# Patient Record
Sex: FEMALE | Race: WHITE
[De-identification: names, ages, dates, MRNs, and addresses within clinical notes are randomized per-mention and may not be internally consistent; named-entity substitution may affect disease eponyms.]

---

## 2017-10-28 ENCOUNTER — HOSPITAL ENCOUNTER (EMERGENCY)
Dept: HOSPITAL 43 - DL.ED | Age: 72
Discharge: HOME | End: 2017-10-28
Payer: MEDICARE
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
## 2017-10-28 DIAGNOSIS — S50.12XA: Primary | ICD-10-CM

## 2017-10-28 DIAGNOSIS — Z79.899: ICD-10-CM

## 2017-10-28 NOTE — EDM.PDOC
ED HPI GENERAL MEDICAL PROBLEM





- General


Chief Complaint: Upper Extremity Injury/Pain


Stated Complaint: [id number] DID SOMETHING TO ARM


Time Seen by Provider: 10/28/17 09:58


Source of Information: Reports: Patient


History Limitations: Reports: No Limitations





- History of Present Illness


INITIAL COMMENTS - FREE TEXT/NARRATIVE: 





71 yo white female c/o left elbow and left forearm pain since last night when 

she was placing a tote on shelf that fell forward onto forearm @ 5pm yesterday.

  Pt. states she applied icyhot to area.


Onset: Today


Onset Date: 10/27/17


Duration: Hour(s):


Location: Reports: Upper Extremity, Left (elbow)


Quality: Reports: Ache


Severity: Mild


Improves with: Reports: Rest


Worsens with: Reports: Movement


Context: Reports: Trauma


Associated Symptoms: Reports: No Other Symptoms





- Related Data


 Allergies











Allergy/AdvReac Type Severity Reaction Status Date / Time


 


No Known Allergies Allergy   Verified 10/28/17 09:51











Home Meds: 


 Home Meds





Acetaminophen [Tylenol Extra Strength] 500 mg PO BID 10/28/17 [History]


Amiodarone [Cordarone] 200 mg PO DAILY 10/28/17 [History]


Atenolol 75 mg PO DAILY 10/28/17 [History]


Budesonide/Formoterol Fumarate [Symbicort 80-4.5 Mcg Inhaler] 1 puff INH DAILY 

10/28/17 [History]


Cholecalciferol (Vitamin D3) [Vitamin D3] 2,000 unit PO BID 10/28/17 [History]


Diltiazem HCl [Cartia Xt] 120 mg PO DAILY 10/28/17 [History]


Fish Oil/Omega-3 Fatty Acids [Fish Oil 1,000 MG] 1,400 mg PO DAILY 10/28/17 [

History]


Fluticasone Propionate [Flonase] 50 mcg [place] DAILY 10/28/17 [History]


Furosemide 20 mg PO DAILY 10/28/17 [History]


Losartan [Cozaar] 50 mg PO DAILY 10/28/17 [History]


Multivitamin [Daily Multiple Vitamin] 1 tab PO DAILY 10/28/17 [History]


Potassium Chloride [Klor-Con M20] 20 meq PO DAILY 10/28/17 [History]


Rivaroxaban [Xarelto] 20 mg PO DAILY 10/28/17 [History]


Spironolactone [Aldactone] 25 mg PO DAILY 10/28/17 [History]


atorvaSTATin [Lipitor] 20 mg PO DAILY 10/28/17 [History]











Social & Family History





- Tobacco Use


Smoking Status *Q: Never Smoker





- Caffeine Use


Caffeine Use: Reports: Coffee, Tea





- Recreational Drug Use


Recreational Drug Use: No





Review of Systems





- Review of Systems


Review Of Systems: See Below


Constitutional: Reports: No Symptoms


Eyes: Reports: No Symptoms


Ears: Reports: No Symptoms


Nose: Reports: No Symptoms


Mouth/Throat: Reports: No Symptoms


Respiratory: Reports: No Symptoms


Cardiovascular: Reports: No Symptoms


GI/Abdominal: Reports: No Symptoms


Genitourinary: Reports: No Symptoms


Musculoskeletal: Reports: Muscle Pain (left forearm)


Skin: Reports: No Symptoms


Neurological: Reports: No Symptoms


Psychiatric: Reports: No Symptoms





ED EXAM, GENERAL





- Physical Exam


Exam: See Below


Exam Limited By: No Limitations


General Appearance: Alert, WD/WN, No Apparent Distress


Eye Exam: Bilateral Eye: PERRL


Ears: Normal External Exam


Nose: Normal Inspection


Throat/Mouth: Normal Inspection


Head: Atraumatic


Neck: Normal Inspection


Respiratory/Chest: No Respiratory Distress


Cardiovascular: Normal Peripheral Pulses


Peripheral Pulses: 2+: Radial (L), Radial (R)


Back Exam: Normal Inspection


Extremities: Normal Inspection, Normal Range of Motion, Other (left proximal 

ventral forearm muscle tenderness w/o swelling and no bruising)


Neurological: Alert, Oriented, CN II-XII Intact, Normal Cognition


Psychiatric: Normal Affect


Skin Exam: Warm, Dry


Lymphatic: No Adenopathy





Course





- Vital Signs


Last Recorded V/S: 


 Last Vital Signs











Temp  35.3 C   10/28/17 09:51


 


Pulse  60   10/28/17 09:51


 


Resp  18   10/28/17 09:51


 


BP  122/56 L  10/28/17 09:51


 


Pulse Ox  96   10/28/17 09:51














- Orders/Labs/Meds


Orders: 


 Active Orders 24 hr











 Category Date Time Status


 


 Elbow 2V Lt [CR] Urgent Exams  10/28/17 09:58 Taken














Departure





- Departure


Time of Disposition: 10:17


Disposition: Home, Self-Care 01


Condition: Good


Clinical Impression: 


 Muscle contusion








- Discharge Information


Forms:  ED Department Discharge


Additional Instructions: 


Rest





Apply Ice Pack to area TID X 15 mins.





For Pain: Use otc TYLENOL ES 500mg QID as needed  


                           OR


                         ADVIL 400mg TID w/ food





F/U w/ PCP





- My Orders


Last 24 Hours: 


My Active Orders





10/28/17 09:58


Elbow 2V Lt [CR] Urgent 














- Assessment/Plan


Last 24 Hours: 


My Active Orders





10/28/17 09:58


Elbow 2V Lt [CR] Urgent

## 2018-04-19 ENCOUNTER — HOSPITAL ENCOUNTER (INPATIENT)
Dept: HOSPITAL 43 - DL.MS | Age: 73
LOS: 4 days | Discharge: HOME | DRG: 193 | End: 2018-04-23
Attending: INTERNAL MEDICINE | Admitting: INTERNAL MEDICINE
Payer: MEDICARE

## 2018-04-19 DIAGNOSIS — I13.0: ICD-10-CM

## 2018-04-19 DIAGNOSIS — N17.9: ICD-10-CM

## 2018-04-19 DIAGNOSIS — I25.10: ICD-10-CM

## 2018-04-19 DIAGNOSIS — I48.91: ICD-10-CM

## 2018-04-19 DIAGNOSIS — E78.00: ICD-10-CM

## 2018-04-19 DIAGNOSIS — J18.9: Primary | ICD-10-CM

## 2018-04-19 DIAGNOSIS — Z79.899: ICD-10-CM

## 2018-04-19 DIAGNOSIS — N18.3: ICD-10-CM

## 2018-04-19 DIAGNOSIS — Z79.01: ICD-10-CM

## 2018-04-19 DIAGNOSIS — E11.22: ICD-10-CM

## 2018-04-19 DIAGNOSIS — H54.7: ICD-10-CM

## 2018-04-19 DIAGNOSIS — J44.0: ICD-10-CM

## 2018-04-19 DIAGNOSIS — I50.33: ICD-10-CM

## 2018-04-19 DIAGNOSIS — J96.01: ICD-10-CM

## 2018-04-19 RX ADMIN — PROMETHAZINE HYDROCHLORIDE AND CODEINE PHOSPHATE PRN ML: 10; 6.25 SOLUTION ORAL at 14:03

## 2018-04-19 RX ADMIN — Medication SCH PUFF: at 20:43

## 2018-04-19 RX ADMIN — Medication SCH UNIT: at 20:44

## 2018-04-19 RX ADMIN — Medication PRN ML: at 20:53

## 2018-04-19 RX ADMIN — Medication PRN ML: at 14:03

## 2018-04-19 RX ADMIN — PROMETHAZINE HYDROCHLORIDE AND CODEINE PHOSPHATE PRN ML: 10; 6.25 SOLUTION ORAL at 23:48

## 2018-04-19 NOTE — PCM.HP
H&P History of Present Illness





- General


Date of Service: 04/19/18


Source of Information: Patient





- History of Present Illness


Initial Comments - Free Text/Narative: 





74 yo with ho cad, diastolic chf, dm, copd 


presented with cough, subjective fever, 


sick contact: daughter





no associated cp


symptoms started 3 days PTA


went to see PMD - noted to have infiltrate


has mild edema, not worse lately














- Related Data


Allergies/Adverse Reactions: 


 Allergies











Allergy/AdvReac Type Severity Reaction Status Date / Time


 


No Known Allergies Allergy   Verified 04/19/18 12:25











Home Medications: 


 Home Meds





Acetaminophen [Tylenol Extra Strength] 500 mg PO BID PRN 10/28/17 [History]


Amiodarone [Cordarone] 200 mg PO DAILY 10/28/17 [History]


Budesonide/Formoterol Fumarate [Symbicort 80-4.5 Mcg Inhaler] 2 puff INH BID 10/

28/17 [History]


Cholecalciferol (Vitamin D3) [Vitamin D3] 2,000 unit PO BID 10/28/17 [History]


Diltiazem HCl [Cartia Xt] 120 mg PO DAILY 10/28/17 [History]


Fish Oil/Omega-3 Fatty Acids [Fish Oil 1,000 MG] 1,000 mg PO DAILY 10/28/17 [

History]


Fluticasone Propionate [Flonase] 50 mcg [place] DAILY 10/28/17 [History]


Losartan [Cozaar] 50 mg PO DAILY 10/28/17 [History]


Multivitamin [Daily Multiple Vitamin] 1 tab PO DAILY 10/28/17 [History]


RX: Atenolol 75 mg PO DAILY 10/28/17 [History]


RX: Furosemide 20 mg PO DAILY 10/28/17 [History]


RX: Potassium Chloride [Klor-Con M20] 20 meq PO DAILY 10/28/17 [History]


RX: Spironolactone [Aldactone] 25 mg PO DAILY 10/28/17 [History]


RX: atorvaSTATin [Lipitor] 20 mg PO DAILY 10/28/17 [History]


Rivaroxaban [Xarelto] 20 mg PO DAILY 10/28/17 [History]


Albuterol [Proair HFA] 2 puff INH DAILY PRN 04/19/18 [History]


Codeine/Promethazine [Phenergan with Codeine] 5 ml PO Q6HR PRN 04/19/18 [History

]


RX: Loratadine 10 mg PO DAILY 04/19/18 [History]











Past Medical History


HEENT History: Reports: Impaired Vision


Cardiovascular History: Reports: Heart Failure, High Cholesterol, Hypertension


Respiratory History: Reports: None


Genitourinary History: Reports: None


OB/GYN History: Reports: None


Neurological History: Reports: None


Psychiatric History: Reports: None


Endocrine/Metabolic History: Reports: None


Hematologic History: Reports: None


Immunologic History: Reports: None


Oncologic (Cancer) History: Reports: None


Dermatologic History: Reports: None





Social & Family History





- Tobacco Use


Smoking Status *Q: Never Smoker





- Caffeine Use


Caffeine Use: Reports: Coffee, Tea





- Recreational Drug Use


Recreational Drug Use: No





H&P Review of Systems





- Review of Systems:


Review Of Systems: See Below (subjective)


General: Reports: Fever


HEENT: Reports: Sore Throat


Pulmonary: Reports: Shortness of Breath, Cough.  Denies: Wheezing, Sputum


Cardiovascular: Denies: Chest Pain


Gastrointestinal: Denies: Abdominal Pain


Psychiatric: Denies: Confusion





Exam





- Exam


Exam: See Below





- Exam


Quality Assessment: No: Supplemental Oxygen


General: Alert, Oriented


Neck: Supple


Lungs: Normal Respiratory Effort, Crackles (basilar)


Cardiovascular: Regular Rate, Regular Rhythm


GI/Abdominal Exam: Normal Bowel Sounds, Soft, Non-Tender


Extremities: Pedal Edema (1+ b/l)


Skin: Warm, Dry


Neuro Extensive - Mental Status: Alert, Oriented x3, Normal Mood/Affect


Psychiatric: Alert, Normal Affect, Normal Mood





- Patient Data


Lab Results Last 24 hrs: 





Results for GERARDO ALVAREZ JORGE (MRN [id number]) as of 4/19/2018 12:53











 Ref. Range 4/3/2018 12:01 4/19/2018 09:57 4/19/2018 09:58


 


BUN Latest Ref Range: 7 - 18 mg/dL 23 (H)  13


 


Sodium Latest Ref Range: 136 - 145 mmol/L 140  138


 


Potassium Latest Ref Range: 3.5 - 5.1 mmol/L 4.2  4.2


 


Chloride Latest Ref Range: 98 - 107 mmol/L 100  98


 


CO2 Latest Ref Range: 21.0 - 32.0 mmol/L 29.2  28.8


 


SERUM GLUCOSE Latest Ref Range: 70 - 99 mg/dL 154 (H)  226 (H)


 


Creatinine Latest Ref Range: 0.6 - 1.0 mg/dL 1.4 (H)  1.7 (H)


 


Calcium Latest Ref Range: 8.5 - 10.1 mg/dL 9.9  9.7


 


ANION GAP Latest Ref Range: 5.0 - 13.0 mmol/L 10.8  11.2


 


GFR Calculated Latest Units: mL/min/1.73 sq m 37  29








Results for GERARDO ALVAREZ (MRN [id number]) as of 4/19/2018 12:53











 Ref. Range 4/19/2018 09:58


 


WBC Latest Ref Range: 3.60 - 11.00 K/uL 14.78 (H)


 


RBC Latest Ref Range: 3.80 - 5.20 M/uL 4.42


 


Hemoglobin Latest Ref Range: 12.0 - 16.0 g/dL 14.2


 


Hematocrit Latest Ref Range: 40.0 - 52.0 % 42.1


 


MCV Latest Ref Range: 80.0 - 100.0 fL 95.2


 


MCH Latest Ref Range: 26.0 - 34.0 pg 32.1


 


MCHC Latest Ref Range: 32.0 - 36.0 g/dL 33.7


 


RDW Latest Ref Range: 37.0 - 50.0 fL 45.6


 


Platelets Latest Ref Range: 150 - 440 K/L 208


 


MPV Latest Ref Range: 8.0 - 13.0 fL 9.9














Imaging Impressions Last 24 hrs: 





from [facility]


cxr 4/19/18


IMPRESSION: 


1. Heart size is borderline with Central vessels at the upper normal.


2. Basilar markings are somewhat difficult to assess. There are prominent I 

believe slightly increased when compared with previous study. I suspect there 

may be some minimal developing basilar infiltrate superimposing chronic 

fibrotic basilar changes. Differential would include congestion but I feel more 

likely minimal infiltrate. I would recommend treatment and short-term follow-up.


3. Pulmonary nodule within the RIGHT upper lobe stable and unchanged


4. Aortic ASVD and tortuosity


5. Underlying hyperinflation.


6. Degenerative changes and dextroscoliosis of the dorsal spine.


Problem List Initiated/Reviewed/Updated: Yes


Orders Last 24hrs: 


 Active Orders 24 hr











 Category Date Time Status


 


 Glucose [Blood Glucose Check, Bedside] [RC] QIDACANDBED Care  04/19/18 12:58 

Ordered


 


 RT Aerosol Therapy [RC] ASDIRECTED Care  04/19/18 12:59 Ordered


 


 BASIC METABOLIC PANEL,BMP [CHEM] AM Lab  04/20/18 05:15 Ordered


 


 CBC WITH AUTO DIFF [HEME] AM Lab  04/20/18 05:15 Ordered


 


 CULTURE BLOOD [BC] Stat Lab  04/19/18 12:58 Ordered


 


 CULTURE BLOOD [BC] Stat Lab  04/19/18 12:58 Ordered


 


 CULTURE SPUTUM + SMEAR [RM] Routine Lab  04/19/18 12:59 Ordered


 


 Acetaminophen [Tylenol Extra Strength] Med  04/19/18 12:51 Ordered





 500 mg PO Q4H PRN   


 


 Albuterol/Ipratropium [DuoNeb 3.0-0.5 MG/3 ML] Med  04/19/18 12:59 Ordered





 3 ml NEB Q6HRRT PRN   


 


 Amiodarone [Cordarone] Med  04/20/18 09:00 Ordered





 200 mg PO DAILY   


 


 Atenolol [Tenormin] Med  04/20/18 09:00 Ordered





 75 mg PO DAILY   


 


 Azithromycin [Zithromax] 500 mg Med  04/19/18 13:00 Ordered





 Sodium Chloride 0.9% [Normal Saline] 250 ml   





 IV Q24H   


 


 Budesonide/Formoterol Fumarate [Symbicort 80-4.5 Mcg Med  04/19/18 21:00 

Ordered





 Inhaler]   





 2 puff INH BID   


 


 Cholecalciferol (Vitamin D3) [Vitamin D3] Med  04/19/18 21:00 Ordered





 2,000 unit PO BID   


 


 Codeine/Promethazine [Phenergan with Codeine] Med  04/19/18 12:51 Ordered





 5 ml PO Q6HR PRN   


 


 Diltiazem [Cardizem CD] Med  04/20/18 09:00 Ordered





 120 mg PO DAILY   


 


 Fish Oil/Omega-3 Fatty Acids [Fish Oil 1,000 MG] Med  04/20/18 09:00 Ordered





 1,000 mg PO DAILY   


 


 Fluticasone Propionate [Flonase] Med  04/20/18 09:00 Ordered





 0.0001 gm [place]WTON DAILY   


 


 Insulin Aspart [NovoLOG] Med  04/19/18 17:00 Ordered





 See Protocol  SUBCUT TIDAC   


 


 Loratadine [Claritin] Med  04/20/18 09:00 Ordered





 10 mg PO DAILY   


 


 Losartan [Cozaar] Med  04/20/18 09:00 Ordered





 50 mg PO DAILY   


 


 Multivitamin [Daily Multiple Vitamin] Med  04/20/18 09:00 Ordered





 1 tab PO DAILY   


 


 Potassium Chloride [Klor-Con M20] Med  04/20/18 09:00 Ordered





 20 meq PO DAILY   


 


 Rivaroxaban [Xarelto] Med  04/20/18 09:00 Ordered





 20 mg PO DAILY   


 


 Spironolactone [Aldactone] Med  04/20/18 09:00 Ordered





 25 mg PO DAILY   


 


 atorvaSTATin [Lipitor] Med  04/20/18 09:00 Ordered





 20 mg PO DAILY   


 


 cefTRIAXone [Rocephin] 1,000 mg Med  04/19/18 13:00 Ordered





 Sodium Chloride 0.9% [Normal Saline] 100 ml   





 IV Q24H   


 


 Blood Culture x2 Reflex Set [OM.PC] Stat Oth  04/19/18 12:58 Ordered








 Medication Orders





Acetaminophen (Tylenol Extra Strength)  500 mg PO Q4H PRN


   PRN Reason: Pain, fever


Albuterol/Ipratropium (Duoneb 3.0-0.5 Mg/3 Ml)  3 ml NEB Q6HRRT PRN


   PRN Reason: sob


Amiodarone HCl (Cordarone)  200 mg PO DAILY [facility]


Atenolol (Tenormin)  75 mg PO DAILY [facility]


Atorvastatin Calcium (Lipitor)  20 mg PO DAILY [facility]


Diltiazem HCl (Cardizem Cd)  120 mg PO DAILY [facility]


Fluticasone Propionate (Flonase)  0.0001 gm NEWTON DAILY [facility]


Azithromycin 500 mg/ Sodium (Chloride)  250 mls @ 250 mls/hr IV Q24H [facility]


Ceftriaxone Sodium 1,000 mg/ (Sodium Chloride)  100 mls @ 200 mls/hr IV Q24H [facility]


Insulin Aspart (Novolog)  0 unit SUBCUT TIDAC [facility]; Protocol


Loratadine (Claritin)  10 mg PO DAILY [facility]


Losartan Potassium (Cozaar)  50 mg PO DAILY [facility]


Non-Formulary Medication (Budesonide/Formoterol Fumarate [Symbicort 80-4.5 Mcg 

Inhaler])  2 puff INH BID [facility]


Non-Formulary Medication (Cholecalciferol (Vitamin D3) [Vitamin D3])  2,000 

unit PO BID [facility]


Non-Formulary Medication (Fish Oil/Omega-3 Fatty Acids [Fish Oil 1,000 Mg])  1,

000 mg PO DAILY [facility]


Non-Formulary Medication (Multivitamin [Daily Multiple Vitamin])  1 tab PO 

DAILY [facility]


Non-Formulary Medication (Potassium Chloride [Klor-Con M20])  20 meq PO DAILY 

[facility]


Non-Formulary Medication (Rivaroxaban [Xarelto])  20 mg PO DAILY [facility]


Promethazine HCl/Codeine (Phenergan With Codeine)  5 ml PO Q6HR PRN


   PRN Reason: Cough


Spironolactone (Aldactone)  25 mg PO DAILY [facility]








Assessment/Plan Comment:: 








1. cough, fever, leukocytosis


R. LL pneumonia





will obtain BC, sputum cx





start azithro, rocephin





2. Acute exacerbation of diastolic chf





last echo in 2016


There is mild to moderate concentric left ventricular hypertrophy.


Ejection Fraction = 55-60%.


The left atrium is moderately dilated.


The right atrium is moderately dilated.


There is mild to moderate mitral regurgitation.


There is mild tricuspid regurgitation.


Right ventricular systolic pressure is elevated at 40-50mmHg.





will increase lasix to IV BID for a few doses


follow elytes and renal fx.





3. YASMIN with CKD III


follow with increased diuretics





4. Afib


appears in NS


cont amiodarone


cont anticoagulation with Xarelto





5. COPD 


no apparent exacerbation


cont symbicort


duoneb prn





6. DVT prophylaxis with Xarelto

## 2018-04-20 LAB
ANION GAP SERPL CALC-SCNC: 12.4 MMOL/L
CHLORIDE SERPL-SCNC: 100 MMOL/L (ref 101–111)
SODIUM SERPL-SCNC: 138 MMOL/L (ref 135–145)

## 2018-04-20 RX ADMIN — Medication SCH UNIT: at 20:52

## 2018-04-20 RX ADMIN — PROMETHAZINE HYDROCHLORIDE AND CODEINE PHOSPHATE PRN ML: 10; 6.25 SOLUTION ORAL at 11:03

## 2018-04-20 RX ADMIN — Medication SCH PUFF: at 08:50

## 2018-04-20 RX ADMIN — FLUTICASONE PROPIONATE SCH SPRAY: 50 SPRAY, METERED NASAL at 08:52

## 2018-04-20 RX ADMIN — DILTIAZEM HYDROCHLORIDE SCH MG: 120 CAPSULE, COATED, EXTENDED RELEASE ORAL at 10:38

## 2018-04-20 RX ADMIN — Medication PRN ML: at 21:12

## 2018-04-20 RX ADMIN — Medication SCH PUFF: at 17:33

## 2018-04-20 RX ADMIN — POTASSIUM CHLORIDE SCH MEQ: 750 TABLET, FILM COATED, EXTENDED RELEASE ORAL at 08:51

## 2018-04-20 RX ADMIN — THERA TABS SCH EACH: TAB at 08:51

## 2018-04-20 RX ADMIN — Medication SCH MG: at 08:50

## 2018-04-20 RX ADMIN — Medication SCH UNIT: at 08:50

## 2018-04-20 NOTE — PCM.PN
- General Info


Date of Service: 04/20/18


Subjective Update: 





Feeling better, voice is still not normal,


Mild shortness of breath only. Had low-grade temperature overnight.


Still on oxygen. On fluid restriction.


Symptoms started prior to admission. They are improving. Still coughing but no 

sputum production.


Functional Status: Reports: Pain Controlled





- Review of Systems


General: Reports: Fever (Low-grade)


Pulmonary: Reports: Shortness of Breath (Mild), Cough (Nonproductive)


Cardiovascular: Denies: Chest Pain


Gastrointestinal: Denies: Abdominal Pain


Genitourinary: Denies: Dysuria


Neurological: Denies: Confusion


Psychiatric: Denies: Depression





- Patient Data


Vitals - Most Recent: 


 Last Vital Signs











Temp  36.8 C   04/20/18 07:00


 


Pulse  55 L  04/20/18 07:00


 


Resp  16   04/20/18 07:00


 


BP  108/71   04/20/18 08:51


 


Pulse Ox  94 L  04/20/18 07:00











Weight - Most Recent: 90.718 kg


I&O - Last 24 Hours: 


 Intake & Output











 04/19/18 04/20/18 04/20/18





 22:59 06:59 14:59


 


Intake Total 250 300 240


 


Output Total  100 


 


Balance 250 200 240











Lab Results Last 24 Hours: 


 Laboratory Results - last 24 hr











  04/19/18 04/20/18 04/20/18 Range/Units





  16:52 05:35 05:35 


 


WBC   8.2   (5.0-10.0)  10^3/uL


 


RBC   3.97 L   (4.2-5.4)  10^6/uL


 


Hgb   12.8   (12.0-16.0)  g/dL


 


Hct   38.8   (37.0-47.0)  %


 


MCV   97.7   ()  fL


 


MCH   32.2   (27.0-34.0)  pg


 


MCHC   33.0   (33.0-35.0)  g/dL


 


Plt Count   140 L   (150-450)  10^3/uL


 


Neut % (Auto)   64.0   (42.2-75.2)  %


 


Lymph % (Auto)   21.6   (20.5-50.1)  %


 


Mono % (Auto)   11.6 H   (2-8)  %


 


Eos % (Auto)   2.6   (1.0-3.0)  %


 


Baso % (Auto)   0.2   (0.0-1.0)  %


 


Sodium    138  (135-145)  mmol/L


 


Potassium    3.4 L  (3.6-5.0)  mmol/L


 


Chloride    100 L  (101-111)  mmol/L


 


Carbon Dioxide    29.0  (21.0-31.0)  mmol/L


 


Anion Gap    12.4  


 


BUN    19 H  (7-18)  mg/dL


 


Creatinine    1.3  (0.6-1.3)  mg/dL


 


Est Cr Clr Drug Dosing    37.48  mL/min


 


Estimated GFR (MDRD)    40  


 


Glucose    126 H  ()  mg/dL


 


POC Glucose  118 H    ()  mg/dl


 


Calcium    9.0  (8.4-10.2)  mg/dl











Med Orders - Current: 


 Current Medications





Acetaminophen (Tylenol Extra Strength)  500 mg PO Q4H PRN


   PRN Reason: Pain, fever


Albuterol/Ipratropium (Duoneb 3.0-0.5 Mg/3 Ml)  3 ml NEB Q6HRRT PRN


   PRN Reason: sob


Amiodarone HCl (Cordarone)  200 mg PO DAILY [facility]


   Last Admin: 04/20/18 08:51 Dose:  200 mg


Atenolol (Tenormin)  75 mg PO DAILY [facility]


Atorvastatin Calcium (Lipitor)  20 mg PO BEDTIME [facility]


   Last Admin: 04/19/18 21:43 Dose:  20 mg


Ceftriaxone Sodium (Rocephin)  1 gm IVPUSH Q24H [facility]


   Last Admin: 04/19/18 14:02 Dose:  1 gm


Diltiazem HCl (Cardizem Cd)  120 mg PO DAILY [facility]


Fluticasone Propionate (Flonase)  0 gm NEWTON DAILY [facility]


   Last Admin: 04/20/18 08:52 Dose:  2 spray


Furosemide (Lasix)  20 mg IVPUSH BIDDIURETIC [facility]


   Last Admin: 04/20/18 08:51 Dose:  20 mg


Azithromycin 500 mg/ Sodium (Chloride)  250 mls @ 250 mls/hr IV Q24H [facility]


   Last Infusion: 04/19/18 15:48 Dose:  Infused


Loratadine (Claritin)  10 mg PO DAILY [facility]


   Last Admin: 04/20/18 08:51 Dose:  10 mg


Losartan Potassium (Cozaar)  50 mg PO DAILY [facility]


   Last Admin: 04/20/18 08:51 Dose:  50 mg


Multivitamins (Thera)  1 each PO DAILY [facility]


   Last Admin: 04/20/18 08:51 Dose:  1 each


Nf Symbicort 80-4.5 (Mcg Inh**Own Med**)  0 puff INH BIDRT [facility]


   Last Admin: 04/20/18 08:50 Dose:  2 puff


Nf Cholecalciferol ( Vitamin D3)2,000 Unit**Own Med**  0 unit PO BID [facility]


   Last Admin: 04/20/18 08:50 Dose:  1 unit


Nf Fish Oil/Omega-3 Fatty Acids 1,200 Mg **Own Med**  0 mg PO DAILY [facility]


   Last Admin: 04/20/18 08:50 Dose:  1 mg


Potassium Chloride (Klor-Con 10)  20 meq PO DAILY@0800 [facility]


   Last Admin: 04/20/18 08:51 Dose:  20 meq


Potassium Chloride (Klor-Con 10)  20 meq PO ONETIME ONE


   Stop: 04/20/18 16:01


Promethazine HCl/Codeine (Phenergan With Codeine)  5 ml PO Q6HR PRN


   PRN Reason: Cough


   Last Admin: 04/19/18 23:48 Dose:  5 ml


Rivaroxaban (Xarelto)  20 mg PO BEDTIME [facility]


   Last Admin: 04/19/18 21:43 Dose:  20 mg


Sodium Chloride (Saline Flush)  10 ml FLUSH ASDIRECTED PRN


   PRN Reason: Keep Vein Open


   Last Admin: 04/19/18 20:53 Dose:  10 ml


Spironolactone (Aldactone)  25 mg PO DAILY [facility]


   Last Admin: 04/20/18 08:51 Dose:  25 mg


Zolpidem Tartrate (Ambien)  5 mg PO BEDTIME PRN


   PRN Reason: Sleep





Discontinued Medications





Insulin Aspart (Novolog)  0 unit SUBCUT TIDAC [facility]; Protocol


   Last Admin: 04/20/18 08:54 Dose:  Not Given











- Exam


Quality Assessment: Supplemental Oxygen


General: Alert, Oriented


HEENT: EOMI


Neck: Supple


Lungs: Clear to Auscultation, Normal Respiratory Effort, Rhonchi (Basilar)


Cardiovascular: Regular Rate, Regular Rhythm


GI/Abdominal Exam: Normal Bowel Sounds, Soft, Non-Tender


Extremities: No Pedal Edema





- Problem List Review


Problem List Initiated/Reviewed/Updated: Yes





- My Orders


Last 24 Hours: 


My Active Orders





04/19/18 12:51


Acetaminophen [Tylenol Extra Strength]   500 mg PO Q4H PRN 


Codeine/Promethazine [Phenergan with Codeine]   5 ml PO Q6HR PRN 





04/19/18 12:58


Blood Culture x2 Reflex Set [OM.PC] Stat 





04/19/18 12:59


RT Aerosol Therapy [RC] 07,18 


CULTURE SPUTUM + SMEAR [RM] Routine 


Albuterol/Ipratropium [DuoNeb 3.0-0.5 MG/3 ML]   3 ml NEB Q6HRRT PRN 





04/19/18 13:35


CULTURE BLOOD [BC] Stat 





04/19/18 13:36


Patient Status [ADT] Routine 


Oxygen Therapy [RC] PRN 


Up With Assistance [RC] ASDIRECTED 


VTE/DVT Education [RC] PER UNIT ROUTINE 


Vital Signs [RC] 03,07,11,15,19,23 


Sodium Chloride 0.9% [Saline Flush]   10 ml FLUSH ASDIRECTED PRN 


Zolpidem [Ambien]   5 mg PO BEDTIME PRN 


Peripheral IV Insertion Adult [OM.PC] Routine 


Saline Lock Insert [OM.PC] Routine 


Resuscitation Status Routine 





04/19/18 13:37


Antiembolic Hose [OM.PC] Per Unit Routine 





04/19/18 13:38


Peripheral IV Care [RC] 08,20 





04/19/18 13:40


CULTURE BLOOD [BC] Stat 





04/19/18 14:00


Azithromycin [Zithromax] 500 mg   Sodium Chloride 0.9% [Normal Saline] 250 ml 

IV Q24H 


Furosemide [Lasix]   20 mg IVPUSH BIDDIURETIC 


cefTRIAXone [Rocephin]   1 gm IVPUSH Q24H 





04/19/18 21:00


Budesonide/Formoterol Fumarate [Symbicort 80-4.5 Mcg Inhaler]   0 puff INH 

BIDRT 


Cholecalciferol (Vitamin D3) [Vitamin D3]   0 unit PO BID 





04/19/18 21:40


Rivaroxaban [Xarelto]   20 mg PO BEDTIME 


atorvaSTATin [Lipitor]   20 mg PO BEDTIME 





04/19/18 Dinner


Regular Diet [DIET] 





04/20/18 08:00


Potassium Chloride [Klor-Con 10]   20 meq PO DAILY@0800 





04/20/18 09:00


Amiodarone [Cordarone]   200 mg PO DAILY 


Atenolol [Tenormin]   75 mg PO DAILY 


Diltiazem [Cardizem CD]   120 mg PO DAILY 


Fish Oil/Omega-3 Fatty Acids [Fish Oil 1,000 MG]   0 mg PO DAILY 


Fluticasone Propionate [Flonase]   0 gm NEWTON DAILY 


Loratadine [Claritin]   10 mg PO DAILY 


Losartan [Cozaar]   50 mg PO DAILY 


Multivitamins,Therapeutic [Thera]   1 each PO DAILY 


Spironolactone [Aldactone]   25 mg PO DAILY 





04/20/18 16:00


Potassium Chloride [Klor-Con 10]   20 meq PO ONETIME ONE 





04/21/18 05:15


BASIC METABOLIC PANEL,BMP [CHEM] AM 


CBC WITH AUTO DIFF [HEME] AM 














- Plan


Plan:: 








1. cough, fever, leukocytosis


R. LL pneumonia with acute hypoxemic respiratory failure





BCx pending


sputum cx: Pending





Treat empirically with azithcodie salcedohin








2. Acute exacerbation of diastolic chf





last echo in 2016


There is mild to moderate concentric left ventricular hypertrophy.


Ejection Fraction = 55-60%.


The left atrium is moderately dilated.


The right atrium is moderately dilated.


There is mild to moderate mitral regurgitation.


There is mild tricuspid regurgitation.


Right ventricular systolic pressure is elevated at 40-50mmHg.





We will continue with increased lasix to IV BID


follow elytes and renal fx.





3. YASMIN with CKD III


follow with increased diuretics





4. Afib


appears in NS


cont amiodarone


cont anticoagulation with Xarelto





5. COPD 


no apparent exacerbation - no wheezing


cont symbicort


duoneb prn





6. Acute hypoxemic respiratory failure


Supplement oxygen as needed


We'll try to taper off oxygen





7. DVT prophylaxis with Xarelto





Discussed with family

## 2018-04-21 LAB
ANION GAP SERPL CALC-SCNC: 12.9 MMOL/L
CHLORIDE SERPL-SCNC: 102 MMOL/L (ref 101–111)
SODIUM SERPL-SCNC: 138 MMOL/L (ref 135–145)

## 2018-04-21 RX ADMIN — Medication SCH PUFF: at 06:26

## 2018-04-21 RX ADMIN — Medication SCH PUFF: at 20:18

## 2018-04-21 RX ADMIN — Medication SCH UNIT: at 21:03

## 2018-04-21 RX ADMIN — PROMETHAZINE HYDROCHLORIDE AND CODEINE PHOSPHATE PRN ML: 10; 6.25 SOLUTION ORAL at 12:16

## 2018-04-21 RX ADMIN — FLUTICASONE PROPIONATE SCH SPRAY: 50 SPRAY, METERED NASAL at 09:05

## 2018-04-21 RX ADMIN — Medication SCH MG: at 09:06

## 2018-04-21 RX ADMIN — Medication PRN ML: at 14:35

## 2018-04-21 RX ADMIN — Medication SCH UNIT: at 09:05

## 2018-04-21 RX ADMIN — PROMETHAZINE HYDROCHLORIDE AND CODEINE PHOSPHATE PRN ML: 10; 6.25 SOLUTION ORAL at 23:11

## 2018-04-21 RX ADMIN — PROMETHAZINE HYDROCHLORIDE AND CODEINE PHOSPHATE PRN ML: 10; 6.25 SOLUTION ORAL at 00:11

## 2018-04-21 RX ADMIN — THERA TABS SCH EACH: TAB at 09:02

## 2018-04-21 RX ADMIN — POTASSIUM CHLORIDE SCH MEQ: 750 TABLET, FILM COATED, EXTENDED RELEASE ORAL at 09:03

## 2018-04-21 RX ADMIN — DILTIAZEM HYDROCHLORIDE SCH MG: 120 CAPSULE, COATED, EXTENDED RELEASE ORAL at 09:01

## 2018-04-21 RX ADMIN — Medication PRN ML: at 21:34

## 2018-04-21 NOTE — PCM.PN
- General Info


Date of Service: 04/21/18


Subjective Update: 





Feeling better, voice is still not normal,


Mild shortness of breath only. 


off oxygen. On fluid restriction.


Symptoms started prior to admission. They are improving. Still coughing but no 

sputum production.


Functional Status: Reports: Pain Controlled, Tolerating Diet





- Review of Systems


General: Denies: Fever


Pulmonary: Reports: Shortness of Breath (Improving)


Cardiovascular: Denies: Chest Pain


Gastrointestinal: Denies: Abdominal Pain


Neurological: Denies: Confusion





- Patient Data


Vitals - Most Recent: 


 Last Vital Signs











Temp  35.9 C   04/21/18 07:00


 


Pulse  58 L  04/21/18 09:02


 


Resp  18   04/21/18 07:00


 


BP  111/49 L  04/21/18 09:02


 


Pulse Ox  96   04/21/18 07:13











Weight - Most Recent: 90.991 kg


I&O - Last 24 Hours: 


 Intake & Output











 04/20/18 04/21/18 04/21/18





 22:59 06:59 14:59


 


Intake Total 550  90


 


Output Total 100  350


 


Balance 450  -260











Lab Results Last 24 Hours: 


 Laboratory Results - last 24 hr











  04/21/18 04/21/18 Range/Units





  05:40 05:40 


 


WBC  7.0   (5.0-10.0)  10^3/uL


 


RBC  3.87 L   (4.2-5.4)  10^6/uL


 


Hgb  12.3   (12.0-16.0)  g/dL


 


Hct  37.8   (37.0-47.0)  %


 


MCV  97.7   ()  fL


 


MCH  31.8   (27.0-34.0)  pg


 


MCHC  32.5 L   (33.0-35.0)  g/dL


 


Plt Count  147 L   (150-450)  10^3/uL


 


Neut % (Auto)  55.7   (42.2-75.2)  %


 


Lymph % (Auto)  27.4   (20.5-50.1)  %


 


Mono % (Auto)  12.6 H   (2-8)  %


 


Eos % (Auto)  4.0 H   (1.0-3.0)  %


 


Baso % (Auto)  0.3   (0.0-1.0)  %


 


Sodium   138  (135-145)  mmol/L


 


Potassium   3.9  (3.6-5.0)  mmol/L


 


Chloride   102  (101-111)  mmol/L


 


Carbon Dioxide   27.0  (21.0-31.0)  mmol/L


 


Anion Gap   12.9  


 


BUN   24 H  (7-18)  mg/dL


 


Creatinine   1.4 H  (0.6-1.3)  mg/dL


 


Est Cr Clr Drug Dosing   34.80  mL/min


 


Estimated GFR (MDRD)   37  


 


Glucose   116 H  ()  mg/dL


 


Calcium   9.1  (8.4-10.2)  mg/dl











Tripp Results Last 24 Hours: 


 Microbiology











 04/20/18 15:25 Gram Stain - Final





 Sputum - Expectorated 


 


 04/19/18 13:40 Aerobic Blood Culture - Preliminary





 Blood - Venous - Lab Draw    NO GROWTH AFTER 1 DAY





 Anaerobic Blood Culture - Preliminary





    NO GROWTH AFTER 1 DAY


 


 04/19/18 13:35 Aerobic Blood Culture - Preliminary





 Blood - Venous    NO GROWTH AFTER 1 DAY





 Anaerobic Blood Culture - Preliminary





    NO GROWTH AFTER 1 DAY











Med Orders - Current: 


 Current Medications





Acetaminophen (Tylenol Extra Strength)  500 mg PO Q4H PRN


   PRN Reason: Pain, fever


Albuterol/Ipratropium (Duoneb 3.0-0.5 Mg/3 Ml)  3 ml NEB Q6HRRT PRN


   PRN Reason: sob


Albuterol/Ipratropium (Duoneb 3.0-0.5 Mg/3 Ml)  3 ml NEB TIDRT [facility]


   Last Admin: 04/21/18 07:11 Dose:  3 ml


Amiodarone HCl (Cordarone)  200 mg PO DAILY [facility]


   Last Admin: 04/21/18 09:01 Dose:  200 mg


Atenolol (Tenormin)  75 mg PO DAILY [facility]


   Last Admin: 04/21/18 09:02 Dose:  75 mg


Atorvastatin Calcium (Lipitor)  20 mg PO BEDTIME [facility]


   Last Admin: 04/20/18 20:53 Dose:  20 mg


Ceftriaxone Sodium (Rocephin)  1 gm IVPUSH Q24H [facility]


   Last Admin: 04/20/18 14:13 Dose:  1 gm


Diltiazem HCl (Cardizem Cd)  120 mg PO DAILY [facility]


   Last Admin: 04/21/18 09:01 Dose:  120 mg


Fluticasone Propionate (Flonase)  0 gm NEWTON DAILY [facility]


   Last Admin: 04/21/18 09:05 Dose:  1 spray


Furosemide (Lasix)  20 mg PO BIDDIURETIC [facility]


Azithromycin 500 mg/ Sodium (Chloride)  250 mls @ 250 mls/hr IV Q24H [facility]


   Last Admin: 04/20/18 14:13 Dose:  100 mls/hr


Loratadine (Claritin)  10 mg PO DAILY [facility]


   Last Admin: 04/21/18 09:01 Dose:  10 mg


Losartan Potassium (Cozaar)  50 mg PO DAILY [facility]


   Last Admin: 04/21/18 09:02 Dose:  50 mg


Multivitamins (Thera)  1 each PO DAILY [facility]


   Last Admin: 04/21/18 09:02 Dose:  1 each


Nf Symbicort 80-4.5 (Mcg Inh**Own Med**)  0 puff INH BIDRT [facility]


   Last Admin: 04/21/18 06:26 Dose:  2 puff


Nf Cholecalciferol ( Vitamin D3)2,000 Unit**Own Med**  0 unit PO BID [facility]


   Last Admin: 04/21/18 09:05 Dose:  1 unit


Nf Fish Oil/Omega-3 Fatty Acids 1,200 Mg **Own Med**  0 mg PO DAILY [facility]


   Last Admin: 04/21/18 09:06 Dose:  1 mg


Potassium Chloride (Klor-Con 10)  20 meq PO DAILY@0800 [facility]


   Last Admin: 04/21/18 09:03 Dose:  20 meq


Promethazine HCl/Codeine (Phenergan With Codeine)  5 ml PO Q6HR PRN


   PRN Reason: Cough


   Last Admin: 04/21/18 00:11 Dose:  5 ml


Rivaroxaban (Xarelto)  20 mg PO BEDTIME [facility]


   Last Admin: 04/20/18 20:53 Dose:  20 mg


Sodium Chloride (Saline Flush)  10 ml FLUSH ASDIRECTED PRN


   PRN Reason: Keep Vein Open


   Last Admin: 04/20/18 21:12 Dose:  10 ml


Spironolactone (Aldactone)  25 mg PO DAILY [facility]


   Last Admin: 04/21/18 09:02 Dose:  25 mg


Zolpidem Tartrate (Ambien)  5 mg PO BEDTIME PRN


   PRN Reason: Sleep





Discontinued Medications





Furosemide (Lasix)  20 mg IVPUSH BIDDIURETIC [facility]


   Last Admin: 04/21/18 09:07 Dose:  20 mg


Insulin Aspart (Novolog)  0 unit SUBCUT TIDAC [facility]; Protocol


   Last Admin: 04/20/18 08:54 Dose:  Not Given


Potassium Chloride (Klor-Con 10)  20 meq PO ONETIME ONE


   Stop: 04/20/18 16:01


   Last Admin: 04/20/18 17:32 Dose:  20 meq











- Exam


General: Alert, Oriented


Lungs: Normal Respiratory Effort, Crackles (Basilar)


Cardiovascular: Regular Rate, Regular Rhythm


GI/Abdominal Exam: Normal Bowel Sounds, Soft, Non-Tender


Extremities: No Pedal Edema





- Problem List Review


Problem List Initiated/Reviewed/Updated: Yes





- My Orders


Last 24 Hours: 


My Active Orders





04/20/18 10:05


RT Aerosol Therapy [RC] ASDIRECTED 





04/20/18 15:00


Albuterol/Ipratropium [DuoNeb 3.0-0.5 MG/3 ML]   3 ml NEB TIDRT 





04/20/18 15:25


CULTURE SPUTUM + SMEAR [RM] Routine 





04/20/18 15:29


Incentive Spirometry [RT Incentive Spirometry] [RC] ASDIRECTED 





04/20/18 15:30


Chest Physiotherapy [RT Chest Physiotherapy] [RC] ASDIRECTED 





04/21/18 14:00


Furosemide [Lasix]   20 mg PO BIDDIURETIC 





04/22/18 05:15


BASIC METABOLIC PANEL,BMP [CHEM] AM 


CBC WITH AUTO DIFF [HEME] AM 














- Plan


Plan:: 








1. cough, fever, leukocytosis


R. LL pneumonia with acute hypoxemic respiratory failure





BCx pending


sputum cx: Pending





Treat empirically with azithro, rocephin








2. Acute exacerbation of diastolic chf





last echo in 2016


There is mild to moderate concentric left ventricular hypertrophy.


Ejection Fraction = 55-60%.


The left atrium is moderately dilated.


The right atrium is moderately dilated.


There is mild to moderate mitral regurgitation.


There is mild tricuspid regurgitation.


Right ventricular systolic pressure is elevated at 40-50mmHg.





We will continue with increased lasix but we will change from IV to oral bid


follow elytes and renal fx.





3. YASMIN with CKD III


follow with increased diuretics





4. Afib


appears in NS


cont amiodarone


cont anticoagulation with Xarelto





5. COPD 


no apparent exacerbation - no wheezing


cont symbicort


duoneb prn





6. Acute hypoxemic respiratory failure


Supplement oxygen as needed








7. DVT prophylaxis with Xarelto

## 2018-04-22 LAB
ANION GAP SERPL CALC-SCNC: 12.3 MMOL/L
CHLORIDE SERPL-SCNC: 105 MMOL/L (ref 101–111)
SODIUM SERPL-SCNC: 140 MMOL/L (ref 135–145)

## 2018-04-22 RX ADMIN — POTASSIUM CHLORIDE SCH MEQ: 750 TABLET, FILM COATED, EXTENDED RELEASE ORAL at 08:28

## 2018-04-22 RX ADMIN — Medication PRN ML: at 17:46

## 2018-04-22 RX ADMIN — Medication SCH UNIT: at 08:39

## 2018-04-22 RX ADMIN — Medication SCH UNIT: at 20:47

## 2018-04-22 RX ADMIN — Medication SCH PUFF: at 17:48

## 2018-04-22 RX ADMIN — DILTIAZEM HYDROCHLORIDE SCH MG: 120 CAPSULE, COATED, EXTENDED RELEASE ORAL at 08:29

## 2018-04-22 RX ADMIN — FLUTICASONE PROPIONATE SCH SPRAY: 50 SPRAY, METERED NASAL at 08:42

## 2018-04-22 RX ADMIN — Medication SCH PUFF: at 06:17

## 2018-04-22 RX ADMIN — THERA TABS SCH EACH: TAB at 08:34

## 2018-04-22 RX ADMIN — Medication PRN ML: at 14:49

## 2018-04-22 RX ADMIN — Medication SCH MG: at 08:40

## 2018-04-22 NOTE — PCM.PN
- General Info


Date of Service: 04/22/18


Subjective Update: 





Feeling better, voice is getting back to normal,


Mild shortness of breath only. 


Was off oxygen during the day but had to reapply during the night. On fluid 

restriction.


Symptoms started prior to admission. They are improving. Still coughing but no 

sputum production.





- Review of Systems


General: Denies: Fever, Weakness


Pulmonary: Reports: Shortness of Breath


Cardiovascular: Denies: Chest Pain


Gastrointestinal: Reports: Abdominal Pain


Neurological: Denies: Confusion





- Patient Data


Vitals - Most Recent: 


 Last Vital Signs











Temp  36.4 C   04/22/18 07:00


 


Pulse  65   04/22/18 08:33


 


Resp  20   04/22/18 07:00


 


BP  127/56 L  04/22/18 08:33


 


Pulse Ox  92 L  04/22/18 08:06











Weight - Most Recent: 91.535 kg


I&O - Last 24 Hours: 


 Intake & Output











 04/21/18 04/22/18 04/22/18





 22:59 06:59 14:59


 


Intake Total 250 300 


 


Output Total 400  300


 


Balance -150 300 -300











Lab Results Last 24 Hours: 


 Laboratory Results - last 24 hr











  04/22/18 04/22/18 Range/Units





  06:05 06:05 


 


WBC  5.7   (5.0-10.0)  10^3/uL


 


RBC  3.88 L   (4.2-5.4)  10^6/uL


 


Hgb  12.2   (12.0-16.0)  g/dL


 


Hct  38.0   (37.0-47.0)  %


 


MCV  97.9   ()  fL


 


MCH  31.4   (27.0-34.0)  pg


 


MCHC  32.1 L   (33.0-35.0)  g/dL


 


Plt Count  158   (150-450)  10^3/uL


 


Neut % (Auto)  54.2   (42.2-75.2)  %


 


Lymph % (Auto)  27.3   (20.5-50.1)  %


 


Mono % (Auto)  13.3 H   (2-8)  %


 


Eos % (Auto)  4.7 H   (1.0-3.0)  %


 


Baso % (Auto)  0.5   (0.0-1.0)  %


 


Add Manual Diff  Yes   


 


Neutrophils % (Manual)  49   (42-75)  %


 


Band Neutrophils %  2   %


 


Lymphocytes % (Manual)  37   (20-50)  %


 


Monocytes % (Manual)  6   (2-8)  %


 


Eosinophils % (Manual)  6 H   (1-3)  %


 


Sodium   140  (135-145)  mmol/L


 


Potassium   4.3  (3.6-5.0)  mmol/L


 


Chloride   105  (101-111)  mmol/L


 


Carbon Dioxide   27.0  (21.0-31.0)  mmol/L


 


Anion Gap   12.3  


 


BUN   19 H  (7-18)  mg/dL


 


Creatinine   1.3  (0.6-1.3)  mg/dL


 


Est Cr Clr Drug Dosing   37.48  mL/min


 


Estimated GFR (MDRD)   40  


 


Glucose   121 H  ()  mg/dL


 


Calcium   9.1  (8.4-10.2)  mg/dl











Tripp Results Last 24 Hours: 


 Microbiology











 04/19/18 13:40 Aerobic Blood Culture - Preliminary





 Blood - Venous - Lab Draw    NO GROWTH AFTER 2 DAYS





 Anaerobic Blood Culture - Preliminary





    NO GROWTH AFTER 2 DAYS


 


 04/19/18 13:35 Aerobic Blood Culture - Preliminary





 Blood - Venous    NO GROWTH AFTER 2 DAYS





 Anaerobic Blood Culture - Preliminary





    NO GROWTH AFTER 2 DAYS











Med Orders - Current: 


 Current Medications





Acetaminophen (Tylenol Extra Strength)  500 mg PO Q4H PRN


   PRN Reason: Pain, fever


Albuterol/Ipratropium (Duoneb 3.0-0.5 Mg/3 Ml)  3 ml NEB Q6HRRT PRN


   PRN Reason: sob


Albuterol/Ipratropium (Duoneb 3.0-0.5 Mg/3 Ml)  3 ml NEB TIDRT [facility]


   Last Admin: 04/22/18 08:02 Dose:  3 ml


Amiodarone HCl (Cordarone)  200 mg PO DAILY [facility]


   Last Admin: 04/22/18 08:34 Dose:  200 mg


Atenolol (Tenormin)  75 mg PO DAILY [facility]


   Last Admin: 04/22/18 08:33 Dose:  75 mg


Atorvastatin Calcium (Lipitor)  20 mg PO BEDTIME [facility]


   Last Admin: 04/21/18 21:04 Dose:  20 mg


Ceftriaxone Sodium (Rocephin)  1 gm IVPUSH Q24H [facility]


   Last Admin: 04/21/18 14:34 Dose:  1 gm


Diltiazem HCl (Cardizem Cd)  120 mg PO DAILY [facility]


   Last Admin: 04/22/18 08:29 Dose:  120 mg


Fluticasone Propionate (Flonase)  0 gm [place] DAILY [facility]


   Last Admin: 04/22/18 08:42 Dose:  1 spray


Furosemide (Lasix)  20 mg PO BIDDIURETIC [facility]


   Last Admin: 04/22/18 08:29 Dose:  20 mg


Azithromycin 500 mg/ Sodium (Chloride)  250 mls @ 250 mls/hr IV Q24H [facility]


   Last Admin: 04/21/18 13:19 Dose:  100 mls/hr


Loratadine (Claritin)  10 mg PO DAILY [facility]


   Last Admin: 04/22/18 08:34 Dose:  10 mg


Losartan Potassium (Cozaar)  50 mg PO DAILY [facility]


   Last Admin: 04/22/18 08:32 Dose:  50 mg


Multivitamins (Thera)  1 each PO DAILY [facility]


   Last Admin: 04/22/18 08:34 Dose:  1 each


Nf Symbicort 80-4.5 ([facility]g In[facility]**Own Med**)  0 puff INH BIDRT [facility]


   Last Admin: 04/22/18 06:17 Dose:  2 puff


Nf Cholecalciferol ( Vitamin D3)2,000 Unit**Own Med**  0 unit PO BID [facility]


   Last Admin: 04/22/18 08:39 Dose:  2,000 unit


Nf Fish Oil/Omega-3 Fatty Acids 1,200 Mg **Own Med**  0 mg PO DAILY [facility]


   Last Admin: 04/22/18 08:40 Dose:  1,200 mg


Potassium Chloride (Klor-Con 10)  20 meq PO DAILY@0800 [facility]


   Last Admin: 04/22/18 08:28 Dose:  20 meq


Promethazine HCl/Codeine (Phenergan With Codeine)  5 ml PO Q6HR PRN


   PRN Reason: Cough


   Last Admin: 04/21/18 23:11 Dose:  5 ml


Rivaroxaban (Xarelto)  20 mg PO BEDTIME [facility]


   Last Admin: 04/21/18 21:04 Dose:  20 mg


Senna/Docusate Sodium (Senna Plus)  1 tab PO BID [facility]


   Last Admin: 04/22/18 08:33 Dose:  1 tab


Sodium Chloride (Saline Flush)  10 ml FLUSH ASDIRECTED PRN


   PRN Reason: Keep Vein Open


   Last Admin: 04/21/18 21:34 Dose:  10 ml


Spironolactone (Aldactone)  25 mg PO DAILY [facility]


   Last Admin: 04/22/18 08:29 Dose:  25 mg


Zolpidem Tartrate (Ambien)  5 mg PO BEDTIME PRN


   PRN Reason: Sleep





Discontinued Medications





Furosemide (Lasix)  20 mg IVPUSH BIDDIURETIC [facility]


   Last Admin: 04/21/18 09:07 Dose:  20 mg


Insulin Aspart (Novolog)  0 unit SUBCUT TIDAC [facility]; Protocol


   Last Admin: 04/20/18 08:54 Dose:  Not Given


Potassium Chloride (Klor-Con 10)  20 meq PO ONETIME ONE


   Stop: 04/20/18 16:01


   Last Admin: 04/20/18 17:32 Dose:  20 meq











- Exam


Quality Assessment: Supplemental Oxygen


General: Alert, Oriented


Lungs: Rales (Basilar)


Cardiovascular: Regular Rate, Regular Rhythm


GI/Abdominal Exam: Normal Bowel Sounds, Soft, Non-Tender


Extremities: No Pedal Edema





- Problem List Review


Problem List Initiated/Reviewed/Updated: Yes





- My Orders


Last 24 Hours: 


My Active Orders





04/21/18 14:00


Furosemide [Lasix]   20 mg PO BIDDIURETIC 





04/21/18 21:00


Docusate Sodium/Sennosides [Senna Plus]   1 tab PO BID 





04/22/18 10:53


Communication Order [RC] ROUTINE 














- Plan


Plan:: 








1. cough, fever, leukocytosis


R. LL pneumonia with acute hypoxemic respiratory failure





BCx negative for now


sputum cx: Pending





Treat empirically with azithro, rocephin








2. Acute exacerbation of diastolic chf





last echo in 2016


There is mild to moderate concentric left ventricular hypertrophy.


Ejection Fraction = 55-60%.


The left atrium is moderately dilated.


The right atrium is moderately dilated.


There is mild to moderate mitral regurgitation.


There is mild tricuspid regurgitation.


Right ventricular systolic pressure is elevated at 40-50mmHg.





We will continue with increased lasix bid


follow elytes and renal fx.





3. YASMIN with CKD III


follow with increased diuretics





4. Afib


appears in NS


cont amiodarone


cont anticoagulation with Xarelto





5. COPD 


no apparent exacerbation - no wheezing


cont symbicort


duoneb prn





6. Acute hypoxemic respiratory failure


Supplement oxygen as needed


It seems during the day she does not need oxygen anymore. We will do overnight 

desat study








7. DVT prophylaxis with Xarelto

## 2018-04-23 LAB
ANION GAP SERPL CALC-SCNC: 12.9 MMOL/L
CHLORIDE SERPL-SCNC: 103 MMOL/L (ref 101–111)
SODIUM SERPL-SCNC: 139 MMOL/L (ref 135–145)

## 2018-04-23 RX ADMIN — FLUTICASONE PROPIONATE SCH SPRAY: 50 SPRAY, METERED NASAL at 08:36

## 2018-04-23 RX ADMIN — THERA TABS SCH EACH: TAB at 08:29

## 2018-04-23 RX ADMIN — DILTIAZEM HYDROCHLORIDE SCH MG: 120 CAPSULE, COATED, EXTENDED RELEASE ORAL at 08:30

## 2018-04-23 RX ADMIN — POTASSIUM CHLORIDE SCH MEQ: 750 TABLET, FILM COATED, EXTENDED RELEASE ORAL at 08:32

## 2018-04-23 RX ADMIN — Medication SCH UNIT: at 08:34

## 2018-04-23 RX ADMIN — Medication SCH MG: at 08:35

## 2018-04-23 RX ADMIN — Medication SCH PUFF: at 08:37

## 2018-04-23 NOTE — [id number].DCSUM1
**Discharge Summary





- Hospital Course


Free Text/Narrative:: 








73-year-old lady with a history of COPD, atrial fibrillation presented with the 

shortness of breath.





1. cough, fever, leukocytosis


R. LL pneumonia with acute hypoxemic respiratory failure





BCx negative for now


sputum cx: Negative





Treated empirically with azithro, rocephin


We'll finish Augmentin therapy








2. Acute exacerbation of diastolic chf





last echo in 2016


There is mild to moderate concentric left ventricular hypertrophy.


Ejection Fraction = 55-60%.


The left atrium is moderately dilated.


The right atrium is moderately dilated.


There is mild to moderate mitral regurgitation.


There is mild tricuspid regurgitation.


Right ventricular systolic pressure is elevated at 40-50mmHg.





We will continue with increased lasix 


follow elytes and renal fx. periodically





3. YASMIN with CKD III


follow with increased diuretics periodically





4. Afib


appears in NS


cont amiodarone


cont anticoagulation with Xarelto





5. COPD 


cont symbicort


duoneb prn





6. Acute hypoxemic respiratory failure due to COPD, congestive heart failure


The patient required oxygen at night 1 L/m along with oxygen during the day 

resting requirement was 1 L/m


The patient will need oxygen likely lifelong. She is active in and around the 

house she would benefit from a portable machine


She was not previously on home oxygen





- Discharge Data


Discharge Date: 04/23/18


Discharge Disposition: Home, Self-Care 01


Condition: Good





- Patient Instructions


Diet: Heart Healthy Diet


Activity: As Tolerated





- Discharge Plan


Prescriptions/Med Rec: 


Amoxicillin/Potassium Clav [Augmentin 500-125 Tablet] 1 each PO TID #21 tablet


Furosemide [Lasix] 40 mg PO DAILY #30 tablet


Home Medications: 


 Home Meds





Acetaminophen [Tylenol Extra Strength] 500 mg PO BID PRN 10/28/17 [History]


Amiodarone [Cordarone] 200 mg PO DAILY 10/28/17 [History]


Atenolol 75 mg PO DAILY 10/28/17 [History]


Budesonide/Formoterol Fumarate [Symbicort 80-4.5 Mcg Inhaler] 2 puff INH BID 10/

28/17 [History]


Cholecalciferol (Vitamin D3) [Vitamin D3] 2,000 unit PO BID 10/28/17 [History]


Diltiazem HCl [Cartia Xt] 120 mg PO DAILY 10/28/17 [History]


Fish Oil/Omega-3 Fatty Acids [Fish Oil 1,000 MG] 1,000 mg PO DAILY 10/28/17 [

History]


Fluticasone Propionate [Flonase] 50 mcg [place]ON DAILY 10/28/17 [History]


Losartan [Cozaar] 50 mg PO DAILY 10/28/17 [History]


Multivitamin [Daily Multiple Vitamin] 1 tab PO DAILY 10/28/17 [History]


Potassium Chloride [Klor-Con M20] 20 meq PO DAILY 10/28/17 [History]


Rivaroxaban [Xarelto] 20 mg PO DAILY 10/28/17 [History]


Spironolactone [Aldactone] 25 mg PO DAILY 10/28/17 [History]


atorvaSTATin [Lipitor] 20 mg PO DAILY 10/28/17 [History]


Albuterol [Proair HFA] 2 puff INH DAILY PRN 04/19/18 [History]


Codeine/Promethazine [Phenergan with Codeine] 5 ml PO Q6HR PRN 04/19/18 [History

]


Loratadine 10 mg PO DAILY 04/19/18 [History]


Amoxicillin/Potassium Clav [Augmentin 500-125 Tablet] 1 each PO TID #21 tablet 

04/23/18 [Rx]


Furosemide [Lasix] 40 mg PO DAILY #30 tablet 04/23/18 [Rx]








Referrals: 


Janie Sanchez PA [Primary Care Provider] -  (in 3-4 days)





- Discharge Summary/Plan Comment


DC Time >30 min.: Yes (arranging home oxygen, d/w daugther, RT)





- General Info


Date of Service: 04/23/18





- Review of Systems


General: Denies: Fever, Weakness


Pulmonary: Reports: Shortness of Breath (Improved)


Cardiovascular: Denies: Chest Pain, Edema


Gastrointestinal: Denies: Abdominal Pain


Neurological: Denies: Confusion


Psychiatric: Denies: Depression





- Patient Data


Vitals - Most Recent: 


 Last Vital Signs











Temp  35.9 C   04/23/18 08:51


 


Pulse  61   04/23/18 08:51


 


Resp  20   04/23/18 08:51


 


BP  140/53 L  04/23/18 08:51


 


Pulse Ox  88 L  04/23/18 08:51











Weight - Most Recent: 91.989 kg


I&O - Last 24 hours: 


 Intake & Output











 04/22/18 04/23/18 04/23/18





 22:59 06:59 14:59


 


Intake Total 798  


 


Output Total 450 800 


 


Balance 348 -800 











Lab Results - Last 24 hrs: 


 Laboratory Results - last 24 hr











  04/23/18 04/23/18 Range/Units





  06:15 06:15 


 


WBC  6.1   (5.0-10.0)  10^3/uL


 


RBC  3.93 L   (4.2-5.4)  10^6/uL


 


Hgb  12.6   (12.0-16.0)  g/dL


 


Hct  38.3   (37.0-47.0)  %


 


MCV  97.5   ()  fL


 


MCH  32.1   (27.0-34.0)  pg


 


MCHC  32.9 L   (33.0-35.0)  g/dL


 


Plt Count  177   (150-450)  10^3/uL


 


Neut % (Auto)  56.5   (42.2-75.2)  %


 


Lymph % (Auto)  27.0   (20.5-50.1)  %


 


Mono % (Auto)  11.8 H   (2-8)  %


 


Eos % (Auto)  4.2 H   (1.0-3.0)  %


 


Baso % (Auto)  0.5   (0.0-1.0)  %


 


Sodium   139  (135-145)  mmol/L


 


Potassium   3.9  (3.6-5.0)  mmol/L


 


Chloride   103  (101-111)  mmol/L


 


Carbon Dioxide   27.0  (21.0-31.0)  mmol/L


 


Anion Gap   12.9  


 


BUN   17  (7-18)  mg/dL


 


Creatinine   1.2  (0.6-1.3)  mg/dL


 


Est Cr Clr Drug Dosing   40.60  mL/min


 


Estimated GFR (MDRD)   44  


 


Glucose   127 H  ()  mg/dL


 


Calcium   9.7  (8.4-10.2)  mg/dl











[place] Results - Last 24 hrs: 


 Microbiology











 04/20/18 15:25 Gram Stain - Final





 Sputum - Expectorated Sputum Culture - Final





    Normal Elza


 


 04/19/18 13:40 Aerobic Blood Culture - Preliminary





 Blood - Venous - Lab Draw    NO GROWTH AFTER 3 DAYS





 Anaerobic Blood Culture - Preliminary





    NO GROWTH AFTER 3 DAYS


 


 04/19/18 13:35 Aerobic Blood Culture - Preliminary





 Blood - Venous    NO GROWTH AFTER 3 DAYS





 Anaerobic Blood Culture - Preliminary





    NO GROWTH AFTER 3 DAYS











Med Orders - Current: 


 Current Medications





Acetaminophen (Tylenol Extra Strength)  500 mg PO Q4H PRN


   PRN Reason: Pain, fever


Albuterol/Ipratropium (Duoneb 3.0-0.5 Mg/3 Ml)  3 ml NEB Q6HRRT PRN


   PRN Reason: sob


Albuterol/Ipratropium (Duoneb 3.0-0.5 Mg/3 Ml)  3 ml NEB TIDRT [facility]


   Last Admin: 04/23/18 07:27 Dose:  3 ml


Amiodarone HCl (Cordarone)  200 mg PO DAILY [facility]


   Last Admin: 04/23/18 08:32 Dose:  200 mg


Atenolol (Tenormin)  75 mg PO DAILY [facility]


   Last Admin: 04/23/18 08:32 Dose:  75 mg


Atorvastatin Calcium (Lipitor)  20 mg PO BEDTIME [facility]


   Last Admin: 04/22/18 20:48 Dose:  20 mg


Ceftriaxone Sodium (Rocephin)  1 gm IVPUSH Q24H [facility]


   Last Admin: 04/22/18 14:49 Dose:  1 gm


Diltiazem HCl (Cardizem Cd)  120 mg PO DAILY [facility]


   Last Admin: 04/23/18 08:30 Dose:  120 mg


Fluticasone Propionate (Flonase)  0 gm NEWTON DAILY [facility]


   Last Admin: 04/23/18 08:36 Dose:  1 spray


Furosemide (Lasix)  20 mg PO BIDDIURETIC [facility]


   Last Admin: 04/23/18 08:29 Dose:  20 mg


Azithromycin 500 mg/ Sodium (Chloride)  250 mls @ 250 mls/hr IV Q24H [facility]


   Last Admin: 04/22/18 15:05 Dose:  100 mls/hr


Loratadine (Claritin)  10 mg PO DAILY [facility]


   Last Admin: 04/23/18 08:31 Dose:  10 mg


Losartan Potassium (Cozaar)  50 mg PO DAILY [facility]


   Last Admin: 04/23/18 08:31 Dose:  50 mg


Multivitamins (Thera)  1 each PO DAILY [facility]


   Last Admin: 04/23/18 08:29 Dose:  1 each


Nf Symbicort 80-4.5 (Mcg Inh**Own Med**)  0 puff INH BIDRT [facility]


   Last Admin: 04/23/18 08:37 Dose:  1 puff


Nf Cholecalciferol ( Vitamin D3)2,000 Unit**Own Med**  0 unit PO BID [facility]


   Last Admin: 04/23/18 08:34 Dose:  1 unit


Nf Fish Oil/Omega-3 Fatty Acids 1,200 Mg **Own Med**  0 mg PO DAILY [facility]


   Last Admin: 04/23/18 08:35 Dose:  1,200 mg


Potassium Chloride (Klor-Con 10)  20 meq PO DAILY@0800 [facility]


   Last Admin: 04/23/18 08:32 Dose:  20 meq


Promethazine HCl/Codeine (Phenergan With Codeine)  5 ml PO Q6HR PRN


   PRN Reason: Cough


   Last Admin: 04/21/18 23:11 Dose:  5 ml


Rivaroxaban (Xarelto)  20 mg PO BEDTIME [facility]


   Last Admin: 04/22/18 20:48 Dose:  20 mg


Senna/Docusate Sodium (Senna Plus)  1 tab PO BID [facility]


   Last Admin: 04/23/18 08:29 Dose:  1 tab


Sodium Chloride (Saline Flush)  10 ml FLUSH ASDIRECTED PRN


   PRN Reason: Keep Vein Open


   Last Admin: 04/22/18 17:46 Dose:  10 ml


Spironolactone (Aldactone)  25 mg PO DAILY [facility]


   Last Admin: 04/23/18 08:31 Dose:  25 mg


Zolpidem Tartrate (Ambien)  5 mg PO BEDTIME PRN


   PRN Reason: Sleep





Discontinued Medications





Furosemide (Lasix)  20 mg IVPUSH BIDDIURETIC [facility]


   Last Admin: 04/21/18 09:07 Dose:  20 mg


Insulin Aspart (Novolog)  0 unit SUBCUT TIDAC [facility]; Protocol


   Last Admin: 04/20/18 08:54 Dose:  Not Given


Potassium Chloride (Klor-Con 10)  20 meq PO ONETIME ONE


   Stop: 04/20/18 16:01


   Last Admin: 04/20/18 17:32 Dose:  20 meq











- Exam


Quality Assessment: Reports: Supplemental Oxygen


General: Reports: Alert, Oriented


Neck: Reports: Supple


Lungs: Reports: Normal Respiratory Effort, Decreased Breath Sounds


Cardiovascular: Reports: Regular Rate, Regular Rhythm


GI/Abdominal Exam: Normal Bowel Sounds, Soft, Non-Tender


Extremities: No Pedal Edema


Skin: Reports: Warm, Dry


Neurological: Reports: No New Focal Deficit


Psy/Mental Status: Reports: Alert, Normal Affect, Normal Mood

## 2020-02-14 NOTE — PCM.HP
H&P History of Present Illness





- General


Date of Service: 02/14/20


Admit Problem/Dx: 


 Admission Diagnosis/Problem





Admission Diagnosis/Problem      COPD, Severe chronic obstructive pulmonary


                                 disease








Source of Information: Patient


History Limitations: Reports: No Limitations





- History of Present Illness


Initial Comments - Free Text/Narative: 


Caty is a 75-year-old female with past medical history of COPD on home oxygen 

1 L at night, hypertension, CAD, A. fib who was sent from clinic for evaluation 

of shortness of breath.  Patient reports 3 days ago she started having cough 

and shortness of breath with hypoxia.  Shortness of breath has gotten 

increasingly worse.  She reports her O2 sats dropped into the 80s and she has 

to increase her oxygen level.  It is associated with wheezing.  Shortness of 

breath is both at stress and with activity.  She notes dry cough within the 

same duration.  She denies fever, chills, nausea, arthralgia, sore throat.  She 

denies ill contacts or recent travel.  She has no leg swelling or calf pain.  

She denies abdominal pain, nausea, vomiting.  At the clinic is also essentially 

unremarkable.  Chest x-ray was negative for infiltrate.  Patient was 

transferred for admission for further management.





Onset of Symptoms: Reports: Gradual


Duration of Symptoms: Reports: Day(s):


Location: Reports: Chest


Quality: Reports: Ache


Severity: Moderate


Improves with: Reports: None


Worsens with: Reports: None


Associated Symptoms: Reports: No Other Symptoms





- Related Data


Allergies/Adverse Reactions: 


 Allergies











Allergy/AdvReac Type Severity Reaction Status Date / Time


 


No Known Allergies Allergy   Verified 02/14/20 17:06











Home Medications: 


 Home Meds





Acetaminophen [Tylenol Extra Strength] 1,000 mg PO Q6HR PRN 10/28/17 [History]


Amiodarone [Cordarone] 200 mg PO DAILY 10/28/17 [History]


Budesonide/Formoterol Fumarate [Symbicort 80-4.5 MCG] 2 puff INH BID 10/28/17 [

History]


Cholecalciferol (Vitamin D3) [Vitamin D3] 2,000 unit PO DAILY 10/28/17 [History]


Fish Oil/Omega-3 Fatty Acids [Fish Oil 1,000 MG] 1,000 mg PO DAILY 10/28/17 [

History]


Fluticasone Propionate [Flonase] 50 mcg [place] DAILY PRN 10/28/17 [History]


Losartan [Cozaar] 25 mg PO DAILY 10/28/17 [History]


Multivitamin [Daily Multiple Vitamin] 1 tab PO DAILY 10/28/17 [History]


Rivaroxaban [Xarelto] 15 mg PO DAILY 10/28/17 [History]


Spironolactone [Aldactone] 25 mg PO DAILY 10/28/17 [History]


atenoloL [Atenolol] 75 mg PO DAILY 10/28/17 [History]


atorvaSTATin [Lipitor] 20 mg PO DAILY 10/28/17 [History]


Albuterol [Proair HFA] 2 puff INH Q6HR PRN 04/19/18 [History]


Codeine/Promethazine [Phenergan with Codeine] 5 ml PO Q4HR PRN 04/19/18 [History

]


Loratadine 10 mg PO DAILY 04/19/18 [History]


Allopurinol [Zyloprim] 100 mg PO DAILY 02/14/20 [History]


Ammonium Lactate [Amlactin 12% Lotion] 1 dose TP BID PRN 02/14/20 [History]


Furosemide [Lasix] 80 mg PO BID 02/14/20 [History]


amLODIPine Besylate [Norvasc] 10 mg PO DAILY 02/14/20 [History]











Past Medical History


HEENT History: Reports: Cataract, Impaired Vision


Cardiovascular History: Reports: Heart Failure, High Cholesterol, Hypertension


Respiratory History: Reports: COPD, SOB


Gastrointestinal History: Reports: GERD, Hiatal Hernia


Genitourinary History: Reports: None


Other Genitourinary History: stress


OB/GYN History: Reports: Pregnancy


Musculoskeletal History: Reports: Arthritis, Fracture, Gout


Neurological History: Reports: Migraines


Psychiatric History: Reports: None


Endocrine/Metabolic History: Reports: Diabetes, Type II


Other Endocrine/Metabolic History: diet controlled


Hematologic History: Reports: None


Immunologic History: Reports: None


Oncologic (Cancer) History: Reports: None


Dermatologic History: Reports: None





- Infectious Disease History


Infectious Disease History: Reports: Chicken Pox, Measles, Mumps





- Past Surgical History


Head Surgeries/Procedures: Reports: None


HEENT Surgical History: Reports: Tonsillectomy


Other Cardiovascular Surgeries/Procedures: cardioversion 3 years ago


Respiratory Surgical History: Reports: None


GI Surgical History: Reports: Appendectomy, Colonoscopy


Female  Surgical History: Reports: None


Endocrine Surgical History: Reports: None


Neurological Surgical History: Reports: None


Musculoskeletal Surgical History: Reports: None


Oncologic Surgical History: Reports: None


Dermatological Surgical History: Reports: None





Social & Family History





- Family History


Family Medical History: Noncontributory





- Tobacco Use


Smoking Status *Q: Former Smoker


Used Tobacco, but Quit: Yes


Month/Year Tobacco Last Used: 1990


Second Hand Smoke Exposure: No





- Caffeine Use


Caffeine Use: Reports: Coffee





- Alcohol Use


Days Per Week of Alcohol Use: 7


Number of Drinks Per Day: 2


Total Drinks Per Week: 14





- Recreational Drug Use


Recreational Drug Use: No





H&P Review of Systems





- Review of Systems:


Review Of Systems: See Below


General: Reports: Weakness


HEENT: Reports: No Symptoms


Pulmonary: Reports: Shortness of Breath, Wheezing, Cough


Cardiovascular: Reports: No Symptoms


Gastrointestinal: Reports: No Symptoms


Genitourinary: Reports: No Symptoms


Musculoskeletal: Reports: No Symptoms


Skin: Reports: No Symptoms


Psychiatric: Reports: No Symptoms


Neurological: Reports: No Symptoms


Hematologic/Lymphatic: Reports: No Symptoms


Immunologic: Reports: No Symptoms





Exam





- Exam


Exam: See Below





- Vital Signs


Vital Signs: 


 Last Vital Signs











Temp  97.2 F   02/14/20 16:25


 


Pulse  56 L  02/14/20 16:25


 


Resp  20   02/14/20 16:25


 


BP  125/46 L  02/14/20 16:25


 


Pulse Ox  80 L  02/14/20 16:25











Weight: 185 lb 3 oz





- Exam


General: Alert, Oriented, 4


HEENT: PERRLA, Hearing Intact, Mucosa Moist & Pink, Nares Patent, Normal Nasal 

Septum, Posterior Pharynx Clear, Conjunctiva Clear, EOMI, EACs Clear, TMs Clear


Neck: Supple, Trachea Midline, 2


Lungs: Normal Respiratory Effort, Wheezing


Cardiovascular: Regular Rate, Regular Rhythm


GI/Abdominal Exam: Normal Bowel Sounds, Soft, Non-Tender, No Organomegaly, No 

Distention, No Abnormal Bruit, No Mass, Pelvis Stable


 (Female) Exam: Normal External Exam, Normal Speculum Exam, Normal Bimanual 

Exam


Rectal (Female) Exam: Normal Exam, Normal Rectal Tone


Back Exam: Normal Inspection, Full Range of Motion, NT


Extremities: Normal Inspection, Normal Range of Motion, Non-Tender, No Pedal 

Edema, Normal Capillary Refill


Skin: Warm, Dry, Intact


Neurological: Cranial Nerves Intact, Reflexes Equal Bilateral


Neuro Extensive - Mental Status: Alert, Oriented x3, Normal Mood/Affect, Normal 

Cognition


Neuro Extensive - Motor, Sensory, Reflexes: CN II-XII Intact, Normal Gait, 

Normal Reflexes


Psychiatric: Alert, Normal Affect, Normal Mood





- Problem List


(1) COPD exacerbation


SNOMED Code(s): 156075376


   ICD Code: J44.1 - CHRONIC OBSTRUCTIVE PULMONARY DISEASE W (ACUTE) 

EXACERBATION   Status: Acute   Current Visit: Yes   





(2) HTN (hypertension)


SNOMED Code(s): 53817335


   ICD Code: I10 - ESSENTIAL (PRIMARY) HYPERTENSION   Status: Acute   Current 

Visit: Yes   


Problem List Initiated/Reviewed/Updated: Yes


Orders Last 24hrs: 


 Active Orders 24 hr











 Category Date Time Status


 


 Patient Status [ADT] Routine ADT  02/14/20 17:30 Ordered


 


 Ambulate [RC] ASDIRECTED Care  02/14/20 17:30 Ordered


 


 Blood Glucose Check, Bedside [RC] QIDACANDBED Care  02/14/20 17:30 Ordered


 


 Height and Weight [RC] DAILY Care  02/14/20 17:30 Ordered


 


 Intake and Output [RC] QSHIFT Care  02/14/20 17:40 Ordered


 


 Notify Provider Vital Signs [RC] ASDIRECTED Care  02/14/20 17:40 Ordered


 


 Oxygen Therapy [RC] PRN Care  02/14/20 17:30 Ordered


 


 Pulse Oximetry [RC] PRN Care  02/14/20 17:40 Ordered


 


 RT Aerosol Therapy [RC] ASDIRECTED Care  02/14/20 17:42 Ordered


 


 VTE/DVT Education [RC] PER UNIT ROUTINE Care  02/14/20 17:30 Ordered


 


 Vital Signs [RC] Q4H Care  02/14/20 17:30 Ordered


 


 Respiratory Care Assess and Treatment [CONS] Routine Cons  02/14/20 17:30 

Ordered


 


 Heart Healthy Diet [DIET] Diet  02/14/20 Dinner Ordered


 


 CBC WITH AUTO DIFF [HEME] DAILY Lab  02/14/20 17:30 Ordered


 


 COMPREHENSIVE METABOLIC PN,CMP [CHEM] DAILY Lab  02/14/20 17:30 Ordered


 


 MAGNESIUM [CHEM] DAILY Lab  02/14/20 17:30 Ordered


 


 PHOSPHORUS [CHEM] Routine Lab  02/14/20 17:30 Ordered


 


 Acetaminophen [Tylenol] Med  02/14/20 17:30 Ordered





 650 mg PO Q4H PRN   


 


 Albuterol [Proventil HFA] Med  02/14/20 17:44 Ordered





 2 puff INH Q6HR PRN   


 


 Albuterol/Ipratropium [DuoNeb 3.0-0.5 MG/3 ML] Med  02/14/20 17:30 Ordered





 3 ml NEB Q4H   


 


 Amiodarone [Cordarone] Med  02/15/20 09:00 Ordered





 200 mg PO DAILY   


 


 Budesonide/Formoterol Fumarate [Symbicort 80-4.5 MCG] Med  02/14/20 21:00 

Ordered





 2 puff INH BID   


 


 Codeine/Promethazine [Phenergan with Codeine] Med  02/14/20 17:44 Ordered





 5 ml PO Q4HR PRN   


 


 Docusate Sodium [Colace] Med  02/14/20 17:30 Ordered





 100 mg PO BID PRN   


 


 Fluticasone Propionate [Flonase] Med  02/14/20 17:44 Ordered





 0.0001 gm NEWTON DAILY PRN   


 


 Furosemide [Lasix] Med  02/14/20 21:00 Ordered





 80 mg PO BID   


 


 Heparin Sodium Med  02/14/20 21:00 Ordered





 5,000 units SUBCUT Q12HR   


 


 Loratadine [Claritin] Med  02/15/20 09:00 Ordered





 10 mg PO DAILY   


 


 Losartan [Cozaar] Med  02/15/20 09:00 Ordered





 25 mg PO DAILY   


 


 Magnesium Hydroxide [Milk of Magnesia] Med  02/14/20 17:30 Ordered





 30 ml PO Q12H PRN   


 


 Ondansetron [Zofran] Med  02/14/20 17:30 Ordered





 4 mg IVPUSH Q6H PRN   


 


 Rivaroxaban [Xarelto] Med  02/15/20 09:00 Ordered





 15 mg PO DAILY   


 


 Spironolactone [Aldactone] Med  02/15/20 09:00 Ordered





 25 mg PO DAILY   


 


 allopurinoL [Zyloprim] Med  02/15/20 09:00 Ordered





 100 mg PO DAILY   


 


 amLODIPine Besylate [Norvasc] Med  02/15/20 09:00 Ordered





 10 mg PO DAILY   


 


 atenoloL [Tenormin] Med  02/15/20 09:00 Ordered





 75 mg PO DAILY   


 


 atorvaSTATin [Lipitor] Med  02/15/20 09:00 Ordered





 20 mg PO DAILY   


 


 Resuscitation Status Routine Resus Stat  02/14/20 17:30 Ordered











Assessment/Plan Comment:: 





#COPD exacerbation


-Admit to medical floor


-Breathing treatments with duo nebs


-IV Solu-Medrol


-Azithromycin








#Acute on chronic respiratory failure due to COPD exacerbation


-Continue supplemental oxygen and wean off as able








#Hypertension


-Within acceptable


-Continue home medication


-Monitor BP closely








#A. fib


-Controlled


-Continue home medication


-Continue Xarelto for chronic anticoagulation





#CAD


-Stable. Continue home medication








#Cardiac diet








#Code status


-Discussion with patient and she prefers to be full code

## 2020-02-15 NOTE — PCM.PN
- General Info


Date of Service: 02/15/20


Admission Dx/Problem (Free Text): 


 Admission Diagnosis/Problem





Admission Diagnosis/Problem      COPD, Severe chronic obstructive pulmonary


                                 disease








Subjective Update: 


Caty is a 75-year-old female with past medical history of COPD on home oxygen 

1 L at night, hypertension, CAD, A. fib who was sent from clinic for evaluation 

of shortness of breath. She was admitted for COPD exacerbation with acute on 

chronic respiratory failure. 





Today she is doing ok. Endorsed feeling better than she came in. Still on 2 L 

via NC and saturating well. She denies fever, chills. No SOB, chest pain. No 

wheezing.


Functional Status: Reports: Pain Controlled





- Review of Systems


General: Reports: No Symptoms


HEENT: Reports: No Symptoms


Pulmonary: Reports: No Symptoms


Cardiovascular: Reports: No Symptoms


Gastrointestinal: Reports: No Symptoms


Genitourinary: Reports: No Symptoms


Musculoskeletal: Reports: No Symptoms


Skin: Reports: No Symptoms


Neurological: Reports: No Symptoms


Psychiatric: Reports: No Symptoms





- Patient Data


Vitals - Most Recent: 


 Last Vital Signs











Temp  97.9 F   02/15/20 07:34


 


Pulse  58 L  02/15/20 07:34


 


Resp  22 H  02/15/20 07:34


 


BP  99/40 L  02/15/20 07:34


 


Pulse Ox  93 L  02/15/20 07:34











Weight - Most Recent: 186 lb 4 oz


I&O - Last 24 Hours: 


 Intake & Output











 02/14/20 02/15/20 02/15/20





 22:59 06:59 14:59


 


Intake Total 150 350 


 


Output Total 50  


 


Balance 100 350 











Lab Results Last 24 Hours: 


 Laboratory Results - last 24 hr











  02/14/20 02/14/20 02/14/20 Range/Units





  19:05 19:05 21:06 


 


WBC  11.0 H    (5.0-10.0)  10^3/uL


 


RBC  3.79 L    (4.2-5.4)  10^6/uL


 


Hgb  12.2    (12.0-16.0)  g/dL


 


Hct  37.2    (37.0-47.0)  %


 


MCV  98.2    ()  fL


 


MCH  32.2    (27.0-34.0)  pg


 


MCHC  32.8 L    (33.0-35.0)  g/dL


 


Plt Count  227    (150-450)  10^3/uL


 


Neut % (Auto)  66.5    (42.2-75.2)  %


 


Lymph % (Auto)  19.5 L    (20.5-50.1)  %


 


Mono % (Auto)  11.4 H    (2-8)  %


 


Eos % (Auto)  2.2    (1.0-3.0)  %


 


Baso % (Auto)  0.4    (0.0-1.0)  %


 


Sodium   134 L   (135-145)  mmol/L


 


Potassium   4.1   (3.6-5.0)  mmol/L


 


Chloride   92 L   (101-111)  mmol/L


 


Carbon Dioxide   29.0   (21.0-31.0)  mmol/L


 


Anion Gap   17.1   


 


BUN   31 H   (7-18)  mg/dL


 


Creatinine   2.8 H D   (0.6-1.3)  mg/dL


 


Est Cr Clr Drug Dosing   16.57   mL/min


 


Estimated GFR (MDRD)   16   


 


BUN/Creatinine Ratio   11.07   


 


Glucose   196 H   ()  mg/dL


 


POC Glucose    197 H  ()  mg/dl


 


Calcium   9.4   (8.4-10.2)  mg/dl


 


Phosphorus   4.2   (2.5-4.6)  mg/dL


 


Magnesium   1.6 L   (1.8-2.5)  mg/dL


 


Total Bilirubin   0.9   (0.2-1.0)  mg/dL


 


AST   29   (10-42)  IU/L


 


ALT   28   (10-60)  IU/L


 


Alkaline Phosphatase   73   ()  IU/L


 


Total Protein   6.6 L   (6.7-8.2)  g/dl


 


Albumin   3.5   (3.2-5.5)  g/dl


 


Globulin   3.1   


 


Albumin/Globulin Ratio   1.13   














  02/15/20 02/15/20 02/15/20 Range/Units





  06:05 06:05 07:54 


 


WBC   6.1   (5.0-10.0)  10^3/uL


 


RBC   3.66 L   (4.2-5.4)  10^6/uL


 


Hgb   12.0   (12.0-16.0)  g/dL


 


Hct   35.0 L   (37.0-47.0)  %


 


MCV   95.6   ()  fL


 


MCH   32.8   (27.0-34.0)  pg


 


MCHC   34.3   (33.0-35.0)  g/dL


 


Plt Count   225   (150-450)  10^3/uL


 


Neut % (Auto)     (42.2-75.2)  %


 


Lymph % (Auto)     (20.5-50.1)  %


 


Mono % (Auto)     (2-8)  %


 


Eos % (Auto)     (1.0-3.0)  %


 


Baso % (Auto)     (0.0-1.0)  %


 


Sodium  134 L    (135-145)  mmol/L


 


Potassium  3.9    (3.6-5.0)  mmol/L


 


Chloride  93 L    (101-111)  mmol/L


 


Carbon Dioxide  27.0    (21.0-31.0)  mmol/L


 


Anion Gap  17.9    


 


BUN  34 H    (7-18)  mg/dL


 


Creatinine  2.6 H    (0.6-1.3)  mg/dL


 


Est Cr Clr Drug Dosing  17.84    mL/min


 


Estimated GFR (MDRD)  18    


 


BUN/Creatinine Ratio     


 


Glucose  278 H    ()  mg/dL


 


POC Glucose    271 H  ()  mg/dl


 


Calcium  8.9    (8.4-10.2)  mg/dl


 


Phosphorus     (2.5-4.6)  mg/dL


 


Magnesium     (1.8-2.5)  mg/dL


 


Total Bilirubin     (0.2-1.0)  mg/dL


 


AST     (10-42)  IU/L


 


ALT     (10-60)  IU/L


 


Alkaline Phosphatase     ()  IU/L


 


Total Protein     (6.7-8.2)  g/dl


 


Albumin     (3.2-5.5)  g/dl


 


Globulin     


 


Albumin/Globulin Ratio     











Med Orders - Current: 


 Current Medications





Acetaminophen (Tylenol)  650 mg PO Q4H PRN


   PRN Reason: Pain (Mild 1-3)/fever


Albuterol (Proventil Hfa)  0 gm INH Q6H PRN


   PRN Reason: Wheezing


Albuterol/Ipratropium (Duoneb 3.0-0.5 Mg/3 Ml)  3 ml NEB Q4H [facility]


   Last Admin: 02/15/20 06:02 Dose:  3 ml


Allopurinol (Zyloprim)  100 mg PO DAILY [facility]


Amiodarone HCl (Cordarone)  200 mg PO DAILY [facility]


Amlodipine Besylate (Norvasc)  10 mg PO DAILY [facility]


Atenolol (Tenormin)  75 mg PO DAILY [facility]


Atorvastatin Calcium (Lipitor)  20 mg PO DAILY [facility]


Docusate Sodium (Colace)  100 mg PO BID PRN


   PRN Reason: Constipation


Fluticasone Propionate (Flonase)  0 gm [place] DAILY PRN


   PRN Reason: Rhinitis


Furosemide (Lasix)  80 mg PO BIDDIURETIC [facility]


   Last Admin: 02/14/20 18:35 Dose:  80 mg


Heparin Sodium (Porcine) (Heparin Sodium)  5,000 units SUBCUT Q12HR [facility]


   Last Admin: 02/14/20 22:02 Dose:  Not Given


Ceftriaxone Sodium 1 gm/ (Sodium Chloride)  50 mls @ 100 mls/hr IV Q24H [facility]


   Last Admin: 02/14/20 21:09 Dose:  50 mls/hr


Magnesium Sulfate 2 gm/ Premix  50 mls @ 25 mls/hr IV ONETIME ONE


   Stop: 02/15/20 11:11


Sodium Chloride (Normal Saline)  1,000 mls @ 100 mls/hr IV ASDIRECTED [facility]


Loratadine (Claritin)  10 mg PO DAILY [facility]


Losartan Potassium (Cozaar)  25 mg PO DAILY [facility]


Magnesium Hydroxide (Milk Of Magnesia)  30 ml PO Q12H PRN


   PRN Reason: Constipation


Methylprednisolone Sodium Succinate (Solu-Medrol)  40 mg IVPUSH Q8H [facility]


   Last Admin: 02/15/20 01:58 Dose:  40 mg


Mometasone Furoate/Formoterol Fumar (Dulera 100-5 Mcg)  2 puff IH BID [facility]


   Last Admin: 02/14/20 21:16 Dose:  2 puff


Ondansetron HCl (Zofran)  4 mg IVPUSH Q6H PRN


   PRN Reason: Nausea/Vomiting


Promethazine HCl/Codeine (Phenergan With Codeine)  5 ml PO Q4HR PRN


   PRN Reason: Cough


Rivaroxaban (Xarelto)  15 mg PO DAILY [facility]


Spironolactone (Aldactone)  25 mg PO DAILY [facility]





Discontinued Medications





Azithromycin 500 mg/ Sodium (Chloride)  250 mls @ 250 mls/hr IV Q24H [facility]


   Last Admin: 02/14/20 18:36 Dose:  Not Given


Ceftriaxone Sodium 1 gm/ (Sodium Chloride)  50 mls @ 100 mls/hr IV Q24H [facility]


   Last Admin: 02/15/20 06:45 Dose:  Not Given











- Exam


Quality Assessment: Supplemental Oxygen, DVT Prophylaxis


General: Alert, Oriented


HEENT: Pupils Equal, Pupils Reactive, EOMI, Mucous Membr. Moist/Pink


Neck: Supple


Lungs: Clear to Auscultation, Normal Respiratory Effort


Cardiovascular: Regular Rate, Regular Rhythm


GI/Abdominal Exam: Normal Bowel Sounds, Soft, Non-Tender, No Organomegaly, No 

Distention, No Abnormal Bruit, No Mass, Pelvis Stable


 (Female) Exam: Normal External Exam, Normal Speculum Exam, Normal Bimanual 

Exam


Back Exam: Normal Inspection, Full Range of Motion


Extremities: Normal Inspection, Normal Range of Motion, Non-Tender, No Pedal 

Edema, Normal Capillary Refill


Skin: Warm, Dry, Intact


Wound/Incisions: Healing Well


Neurological: No New Focal Deficit


Psy/Mental Status: Alert, Normal Affect, Normal Mood





Sepsis Event Note





- Evaluation


Sepsis Screening Result: No Definite Risk





- Focused Exam


Vital Signs: 


 Vital Signs











  Temp Pulse Resp BP Pulse Ox


 


 02/15/20 07:34  97.9 F  58 L  22 H  99/40 L  93 L


 


 02/15/20 04:00  96.8 F L  60  20  127/50 L  95











Date Exam was Performed: 02/15/20


Time Exam was Performed: 09:51





- Problem List & Annotations


(1) COPD exacerbation


SNOMED Code(s): 061339147


   Code(s): J44.1 - CHRONIC OBSTRUCTIVE PULMONARY DISEASE W (ACUTE) 

EXACERBATION   Status: Acute   Current Visit: Yes   





(2) HTN (hypertension)


SNOMED Code(s): 60498528


   Code(s): I10 - ESSENTIAL (PRIMARY) HYPERTENSION   Status: Acute   Current 

Visit: Yes   





- Problem List Review


Problem List Initiated/Reviewed/Updated: Yes





- My Orders


Last 24 Hours: 


My Active Orders





02/14/20 17:30


Patient Status [ADT] Routine 


Ambulate [RC] ASDIRECTED 


Blood Glucose Check, Bedside [RC] QIDACANDBED 


Height and Weight [RC] 06 


Oxygen Therapy [RC] PRN 


VTE/DVT Education [RC] PER UNIT ROUTINE 


Vital Signs [RC] 00,04,08,12,16,20 


Respiratory Care Assess and Treatment [CONS] Routine 


Acetaminophen [Tylenol]   650 mg PO Q4H PRN 


Albuterol/Ipratropium [DuoNeb 3.0-0.5 MG/3 ML]   3 ml NEB Q4H 


Docusate Sodium [Colace]   100 mg PO BID PRN 


Magnesium Hydroxide [Milk of Magnesia]   30 ml PO Q12H PRN 


Ondansetron [Zofran]   4 mg IVPUSH Q6H PRN 


Resuscitation Status Routine 





02/14/20 17:40


Intake and Output [RC] QSHIFT 


Notify Provider Vital Signs [RC] ASDIRECTED 


Pulse Oximetry [RC] PRN 





02/14/20 17:42


RT Aerosol Therapy [RC] ASDIRECTED 





02/14/20 17:44


Albuterol [Proventil HFA]   0 gm INH Q6H PRN 


Codeine/Promethazine [Phenergan with Codeine]   5 ml PO Q4HR PRN 


Fluticasone Propionate [Flonase]   0 gm [place] DAILY PRN 





02/14/20 18:00


methylPREDNISolone Sod Succ [Solu-MEDROL]   40 mg IVPUSH Q8H 





02/14/20 18:30


Furosemide [Lasix]   80 mg PO BIDDIURETIC 





02/14/20 20:00


cefTRIAXone [Rocephin] 1 gm   Sodium Chloride 0.9% [Normal Saline] 50 ml IV 

Q24H 





02/14/20 21:00


Mometasone/Formoterol [Dulera 100-5 MCG]   2 puff IH BID 





02/14/20 21:45


Heparin Sodium   5,000 units SUBCUT Q12HR 





02/14/20 Dinner


Heart Healthy Diet [DIET] 





02/15/20 09:00


Amiodarone [Cordarone]   200 mg PO DAILY 


Loratadine [Claritin]   10 mg PO DAILY 


Losartan [Cozaar]   25 mg PO DAILY 


Rivaroxaban [Xarelto]   15 mg PO DAILY 


Spironolactone [Aldactone]   25 mg PO DAILY 


allopurinoL [Zyloprim]   100 mg PO DAILY 


amLODIPine [Norvasc]   10 mg PO DAILY 


atenoloL [Tenormin]   75 mg PO DAILY 


atorvaSTATin [Lipitor]   20 mg PO DAILY 





02/15/20 09:12


Magnesium Sulfate/Water [Magnesium Sulfate in Water Premix] 2 gm   Premix Bag 1 

bag IV ONETIME 





02/15/20 10:00


Sodium Chloride 0.9% [Normal Saline] 1,000 ml IV ASDIRECTED 














- Plan


Plan:: 





#COPD exacerbation


-Admit to medical floor


-Breathing treatments with duo nebs


-IV Solu-Medrol


-Azithromycin








#Acute on chronic respiratory failure due to COPD exacerbation


-Improving


-Continue supplemental oxygen and wean off as able








#Hypertension


-Within acceptable


-Continue home medication


-Monitor BP closely








#A. fib


-Controlled


-Continue home medication


-Continue Xarelto for chronic anticoagulation





#CAD


-Stable. Continue home medication





#CKD IV


-Baseline crt and GFR








#Cardiac diet








#Code status


-Discussion with patient and she prefers to be full code

## 2020-02-16 NOTE — PCM.PN
- General Info


Date of Service: 02/16/20


Admission Dx/Problem (Free Text): 


 Admission Diagnosis/Problem





Admission Diagnosis/Problem      COPD, Severe chronic obstructive pulmonary


                                 disease








Subjective Update: 


Caty is a 75-year-old female with past medical history of COPD on home oxygen 

1 L at night, hypertension, CAD, A. fib who was sent from clinic for evaluation 

of shortness of breath. She was admitted for COPD exacerbation with acute on 

chronic respiratory failure. 





Today she is doing ok but still on 2 L via NC and saturating well. She denies 

fever, chills. No SOB, chest pain. No wheezing. 


Functional Status: Reports: Pain Controlled





- Review of Systems


General: Reports: No Symptoms


HEENT: Reports: No Symptoms


Pulmonary: Reports: No Symptoms


Cardiovascular: Reports: No Symptoms


Gastrointestinal: Reports: No Symptoms


Genitourinary: Reports: No Symptoms


Musculoskeletal: Reports: No Symptoms


Skin: Reports: No Symptoms


Neurological: Reports: No Symptoms


Psychiatric: Reports: No Symptoms





- Patient Data


Vitals - Most Recent: 


 Last Vital Signs











Temp  97.4 F   02/16/20 11:32


 


Pulse  66   02/16/20 11:32


 


Resp  20   02/16/20 11:32


 


BP  119/41 L  02/16/20 11:32


 


Pulse Ox  95   02/16/20 11:32











Weight - Most Recent: 193 lb 8 oz


I&O - Last 24 Hours: 


 Intake & Output











 02/15/20 02/16/20 02/16/20





 22:59 06:59 14:59


 


Intake Total 375 1550 


 


Output Total  750 


 


Balance 375 800 











Lab Results Last 24 Hours: 


 Laboratory Results - last 24 hr











  02/15/20 02/15/20 02/16/20 Range/Units





  16:54 21:01 07:52 


 


WBC     (5.0-10.0)  10^3/uL


 


RBC     (4.2-5.4)  10^6/uL


 


Hgb     (12.0-16.0)  g/dL


 


Hct     (37.0-47.0)  %


 


MCV     ()  fL


 


MCH     (27.0-34.0)  pg


 


MCHC     (33.0-35.0)  g/dL


 


Plt Count     (150-450)  10^3/uL


 


Sodium     (135-145)  mmol/L


 


Potassium     (3.6-5.0)  mmol/L


 


Chloride     (101-111)  mmol/L


 


Carbon Dioxide     (21.0-31.0)  mmol/L


 


Anion Gap     


 


BUN     (7-18)  mg/dL


 


Creatinine     (0.6-1.3)  mg/dL


 


Est Cr Clr Drug Dosing     mL/min


 


Estimated GFR (MDRD)     


 


Glucose     ()  mg/dL


 


POC Glucose  372 H  360 H  268 H  ()  mg/dl


 


Calcium     (8.4-10.2)  mg/dl


 


Phosphorus     (2.5-4.6)  mg/dL


 


Magnesium     (1.8-2.5)  mg/dL


 


B-Natriuretic Peptide     (0-100)  pg/ml














  02/16/20 02/16/20 02/16/20 Range/Units





  10:16 10:16 11:55 


 


WBC  14.7 H    (5.0-10.0)  10^3/uL


 


RBC  3.63 L    (4.2-5.4)  10^6/uL


 


Hgb  11.7 L    (12.0-16.0)  g/dL


 


Hct  35.1 L    (37.0-47.0)  %


 


MCV  96.7    ()  fL


 


MCH  32.2    (27.0-34.0)  pg


 


MCHC  33.3    (33.0-35.0)  g/dL


 


Plt Count  273    (150-450)  10^3/uL


 


Sodium   131 L   (135-145)  mmol/L


 


Potassium   3.6   (3.6-5.0)  mmol/L


 


Chloride   95 L   (101-111)  mmol/L


 


Carbon Dioxide   21.0   (21.0-31.0)  mmol/L


 


Anion Gap   18.6   


 


BUN   44 H   (7-18)  mg/dL


 


Creatinine   2.4 H   (0.6-1.3)  mg/dL


 


Est Cr Clr Drug Dosing   19.33   mL/min


 


Estimated GFR (MDRD)   20   


 


Glucose   408 H*   ()  mg/dL


 


POC Glucose    343 H  ()  mg/dl


 


Calcium   8.6   (8.4-10.2)  mg/dl


 


Phosphorus   4.1   (2.5-4.6)  mg/dL


 


Magnesium   2.0   (1.8-2.5)  mg/dL


 


B-Natriuretic Peptide   396 H   (0-100)  pg/ml











Med Orders - Current: 


 Current Medications





Acetaminophen (Tylenol)  650 mg PO Q4H PRN


   PRN Reason: Pain (Mild 1-3)/fever


Albuterol (Proventil Hfa)  0 gm INH Q6H PRN


   PRN Reason: Wheezing


Albuterol/Ipratropium (Duoneb 3.0-0.5 Mg/3 Ml)  3 ml NEB Q4H [facility]


   Last Admin: 02/16/20 11:17 Dose:  3 ml


Allopurinol (Zyloprim)  100 mg PO DAILY [facility]


   Last Admin: 02/16/20 09:18 Dose:  100 mg


Amiodarone HCl (Cordarone)  200 mg PO DAILY [facility]


   Last Admin: 02/16/20 09:18 Dose:  200 mg


Amlodipine Besylate (Norvasc)  10 mg PO DAILY [facility]


   Last Admin: 02/16/20 09:16 Dose:  10 mg


Atenolol (Tenormin)  75 mg PO DAILY [facility]


   Last Admin: 02/16/20 09:18 Dose:  75 mg


Atorvastatin Calcium (Lipitor)  20 mg PO DAILY [facility]


   Last Admin: 02/16/20 09:19 Dose:  20 mg


Docusate Sodium (Colace)  100 mg PO BID PRN


   PRN Reason: Constipation


Fluticasone Propionate (Flonase)  0 gm [place] DAILY PRN


   PRN Reason: Rhinitis


Furosemide (Lasix)  80 mg PO BIDDIURETIC [facility]


   Last Admin: 02/16/20 09:13 Dose:  80 mg


Heparin Sodium (Porcine) (Heparin Sodium)  5,000 units SUBCUT Q12HR [facility]


   Last Admin: 02/16/20 09:21 Dose:  Not Given


Ceftriaxone Sodium 1 gm/ (Sodium Chloride)  50 mls @ 100 mls/hr IV Q24H [facility]


   Last Admin: 02/15/20 20:10 Dose:  50 mls/hr


Sodium Chloride (Normal Saline)  1,000 mls @ 100 mls/hr IV ASDIRECTED [facility]


   Last Admin: 02/16/20 07:18 Dose:  100 mls/hr


Insulin Human Lispro (Humalog)  0 unit SUBCUT WITHMEALSANDBED [facility]; Protocol


   Last Admin: 02/16/20 09:28 Dose:  10 units


Insulin Human Lispro (Humalog)  5 unit SUBCUT TIDMEALS [facility]


Loratadine (Claritin)  10 mg PO DAILY [facility]


   Last Admin: 02/16/20 09:15 Dose:  10 mg


Losartan Potassium (Cozaar)  25 mg PO DAILY [facility]


   Last Admin: 02/16/20 09:15 Dose:  25 mg


Magnesium Hydroxide (Milk Of Magnesia)  30 ml PO Q12H PRN


   PRN Reason: Constipation


Methylprednisolone Sodium Succinate (Solu-Medrol)  40 mg IVPUSH Q8H [facility]


   Last Admin: 02/16/20 09:22 Dose:  40 mg


Mometasone Furoate/Formoterol Fumar (Dulera 100-5 Mcg)  2 puff IH BID [facility]


   Last Admin: 02/16/20 09:13 Dose:  2 puff


Ondansetron HCl (Zofran)  4 mg IVPUSH Q6H PRN


   PRN Reason: Nausea/Vomiting


Promethazine HCl/Codeine (Phenergan With Codeine)  5 ml PO Q4HR PRN


   PRN Reason: Cough


Rivaroxaban (Xarelto)  15 mg PO DAILY [facility]


   Last Admin: 02/16/20 09:16 Dose:  15 mg


Spironolactone (Aldactone)  25 mg PO DAILY [facility]


   Last Admin: 02/16/20 09:17 Dose:  25 mg





Discontinued Medications





Albuterol/Ipratropium (Duoneb 3.0-0.5 Mg/3 Ml)  3 ml NEB Q4H [facility]


   Last Admin: 02/15/20 09:45 Dose:  3 ml


Azithromycin 500 mg/ Sodium (Chloride)  250 mls @ 250 mls/hr IV Q24H [facility]


   Last Admin: 02/14/20 18:36 Dose:  Not Given


Ceftriaxone Sodium 1 gm/ (Sodium Chloride)  50 mls @ 100 mls/hr IV Q24H [facility]


   Last Admin: 02/15/20 06:45 Dose:  Not Given


Magnesium Sulfate 2 gm/ Premix  50 mls @ 25 mls/hr IV ONETIME ONE


   Stop: 02/15/20 11:11


   Last Admin: 02/15/20 10:17 Dose:  25 mls/hr


Insulin Human Regular (Humulin R)  10 unit IV ONETIME ONE


   Stop: 02/16/20 11:22


   Last Admin: 02/16/20 11:33 Dose:  10 units











- Exam


Quality Assessment: Supplemental Oxygen, DVT Prophylaxis


General: Alert, Oriented


HEENT: Pupils Equal, Pupils Reactive, EOMI, Mucous Membr. Moist/Pink


Neck: Supple


Lungs: Clear to Auscultation, Normal Respiratory Effort


Cardiovascular: Regular Rate, Regular Rhythm


GI/Abdominal Exam: Normal Bowel Sounds, Soft, Non-Tender, No Organomegaly, No 

Distention, No Abnormal Bruit, No Mass, Pelvis Stable


 (Female) Exam: Normal External Exam, Normal Speculum Exam, Normal Bimanual 

Exam


Back Exam: Normal Inspection, Full Range of Motion


Extremities: Normal Inspection, Normal Range of Motion, Non-Tender, No Pedal 

Edema, Normal Capillary Refill


Skin: Warm, Dry, Intact


Wound/Incisions: Healing Well


Neurological: No New Focal Deficit


Psy/Mental Status: Alert, Normal Affect, Normal Mood





Sepsis Event Note





- Evaluation


Sepsis Screening Result: No Definite Risk





- Focused Exam


Vital Signs: 


 Vital Signs











  Temp Pulse Pulse Resp BP BP Pulse Ox


 


 02/16/20 11:32  97.4 F   66  20   119/41 L  95


 


 02/16/20 11:18    67    


 


 02/16/20 09:18   73    109/50 L  


 


 02/16/20 09:16      109/50 L  


 


 02/16/20 09:15      109/50 L  


 


 02/16/20 07:59  98 F   75  20   109/48 L  93 L


 


 02/16/20 07:48    67    


 


 02/16/20 04:00  98.6 F   72  20   117/44 L  89 L














  Pulse Ox


 


 02/16/20 11:32 


 


 02/16/20 11:18  92 L


 


 02/16/20 09:18 


 


 02/16/20 09:16 


 


 02/16/20 09:15 


 


 02/16/20 07:59 


 


 02/16/20 07:48  90 L


 


 02/16/20 04:00 











Date Exam was Performed: 02/16/20


Time Exam was Performed: 12:30





- Problem List & Annotations


(1) COPD exacerbation


SNOMED Code(s): 462058971


   Code(s): J44.1 - CHRONIC OBSTRUCTIVE PULMONARY DISEASE W (ACUTE) 

EXACERBATION   Status: Acute   Current Visit: Yes   





(2) HTN (hypertension)


SNOMED Code(s): 06451545


   Code(s): I10 - ESSENTIAL (PRIMARY) HYPERTENSION   Status: Acute   Current 

Visit: Yes   





- Problem List Review


Problem List Initiated/Reviewed/Updated: Yes





- My Orders


Last 24 Hours: 


My Active Orders





02/15/20 12:00


Insulin Lispro [HumaLOG]   See Protocol  SUBCUT WITHMEALSANDBED 





02/16/20 09:20


Communication Order [RC] ONETIME 





02/16/20 12:00


Insulin Lispro [HumaLOG]   5 unit SUBCUT TIDMEALS 














- Plan


Plan:: 





#COPD exacerbation


-Breathing treatments with duo nebs


-IV Solu-Medrol


-Azithromycin











#Acute on chronic respiratory failure due to COPD exacerbation


-Improving


-Continue supplemental oxygen and wean off as able








#Leukocytosis due to steroid use


-Daily cbc








#BNP mildly elevated


-Patient euvolemic on exam


-Continue PO Lasix








#DM-II


-Serum glucose elevated today. Steroid could be contributing to this


-Continue current insulin regimen plus SSI


-Add Lispro 5 units qmeals


-Accu-cheks


-Hypoglycemic protocol








#Hypertension


-Within acceptable


-Continue home medication


-Monitor BP closely








#A. fib


-Controlled


-Continue home medication


-Continue Xarelto for chronic anticoagulation





#CAD


-Stable. Continue home medication





#CKD IV


-Baseline crt and GFR








#Cardiac diet








#Code status


-Discussion with patient and she prefers to be full code

## 2020-10-22 NOTE — OR
DATE:  10/21/2020

 

PREOPERATIVE DIAGNOSIS:  Visually significant mixed cataract, left eye.

 

POSTOPERATIVE DIAGNOSIS:  Visually significant mixed cataract, left eye.

 

PROCEDURE:  Extracapsular cataract extraction with intraocular lens implant,

left eye.

 

ANESTHESIA:  Topical/local MAC.

 

COMPLICATIONS:  None.

 

INDICATION:  Ms. Beasley was seen in the clinic with complaints of blurred vision.

Examination revealed visually significant mixed cataract.  I explained options,

offered cataract surgery, and I explained risks preoperatively including, but

not limited to, infection, retinal detachment, loss of vision, need for

additional surgery, lens or implant dislocation, and risks associated with

anesthesia including loss of life.  We discussed implant options.  She has

requested a multifocal implant.  I did explain the increased potential for

glare, halo, and dysphotopsia.  I also explained that she may still require

glasses for some activities.  She voiced understanding, wished to proceed.

 

OPERATIVE DESCRIPTION:  After informed consent was obtained and the risks,

benefits, and alternatives were explained, the patient was brought to the

operative suite and topical anesthesia was administered.  The patient was then

prepped and draped in the sterile fashion and attention was placed on the left

eye.  A sterile lid speculum was placed into the left eye to allow operative

exposure. A full-thickness paracentesis was made in the temporal portion of the

operative eye. Preservative-free lidocaine 0.1 mL was injected into the anterior

chamber followed by viscoelastic. A full-thickness corneal incision was then

made into the anterior chamber.  A bent needle cystotome was used to create a

small nick in the anterior capsule. The capsulorrhexis forceps was then used to

create a 360-degree curvilinear capsulorrhexis.  The nucleus was then removed

using a phacoemulsification handpiece and the remaining cortical material was

then removed with irrigation and aspiration handpiece. Following removal of the

cortical material, the capsular bag was then inspected and noted to be free of

any holes or tears. Viscoelastic was then injected into the capsular bag and the

intraocular lens was inserted into the capsular bag. The viscoelastic material

was then removed from both the anterior and posterior chambers and from behind

the IOL. The lens and capsular bag were then reinspected. The IOL was well

centered and the capsular bag intact. The wound and paracentesis sites were

inspected and hydrated with balanced saline solution. Both were found to be self-

sealing. The intraocular pressure was assessed digitally and found to be within

normal range. A good red reflex was noted at the completion of the procedure. No

complications occurred during the operation. At the completion of the procedure,

Maxitrol, Voltaren, and Iopidine drops were placed into the operative eye. A

sterile eye shield was placed over the operative eye and the patient was

transported to the postoperative recovery area having tolerated the procedure

well. Postoperative instructions were given along with a postoperative

appointment.  The patient was advised to call with any questions or concerns.

 

DD:  10/21/2020 09:18:57

DT:  10/21/2020 13:56:31

[facility]

Job #:  [id number]

## 2020-10-28 NOTE — OR
DATE:  10/28/2020

 

PREOPERATIVE DIAGNOSIS:  Visually significant mixed cataract, right eye.

 

POSTOPERATIVE DIAGNOSIS:  Visually significant mixed cataract, right eye.

 

PROCEDURE:  Extracapsular cataract extraction with intraocular lens implant,

right eye.

 

ANESTHESIA:  Topical/local MAC.

 

COMPLICATIONS:  None.

 

INDICATION:  Ms. Beasley was seen in the clinic.  She is unhappy with her vision

noticing a progressive change.  Examination revealed visually significant mixed

cataract and pseudoexfoliation.  I explained options, offered cataract surgery,

and I explained risks, including, but not limited to, infection, retinal

detachment, loss of vision, need for additional surgery, and risks associated

with anesthesia.  I also explained risks associated with pseudoexfoliation

including potential for lens or implant dislocation.  We discussed implant

options.  She has requested a multifocal implant.  She understands the increased

potential for glare, halo, and dysphotopsia.

 

OPERATIVE DESCRIPTION:  After informed consent was obtained and the risks,

benefits, and alternatives were explained, the patient was brought to the

operative suite and topical anesthesia was administered.  The patient was then

prepped and draped in the sterile fashion and attention was placed on the right

eye.  A sterile lid speculum was placed into the right eye to allow operative

exposure. A full-thickness paracentesis was made in the temporal portion of the

operative eye. Preservative-free lidocaine 0.1 mL was injected into the anterior

chamber followed by viscoelastic. A full-thickness corneal incision was then

made into the anterior chamber.  A bent needle cystotome was used to create a

small nick in the anterior capsule. The capsulorrhexis forceps was then used to

create a 360-degree curvilinear capsulorrhexis.  The nucleus was then removed

using a phacoemulsification handpiece and the remaining cortical material was

then removed with irrigation and aspiration handpiece. Following removal of the

cortical material, the capsular bag was then inspected and noted to be free of

any holes or tears. Viscoelastic was then injected into the capsular bag and the

intraocular lens was inserted into the capsular bag. The viscoelastic material

was then removed from both the anterior and posterior chambers and from behind

the IOL. The lens and capsular bag were then reinspected. The IOL was well

centered and the capsular bag intact. The wound and paracentesis sites were

inspected and hydrated with balanced saline solution. Both were found to be self-

sealing. The intraocular pressure was assessed digitally and found to be within

normal range. A good red reflex was noted at the completion of the procedure. No

complications occurred during the operation. At the completion of the procedure,

Maxitrol, Voltaren, and Iopidine drops were placed into the operative eye. A

sterile eye shield was placed over the operative eye and the patient was

transported to the postoperative recovery area having tolerated the procedure

well. Postoperative instructions were given along with a postoperative

appointment.  The patient was advised to call with any questions or concerns.

 

DD:  10/28/2020 09:06:27

DT:  10/28/2020 14:14:10

[facility]

Job #:  [id number]

## 2022-12-12 NOTE — PCM.[id number]
**Discharge Summary





- Hospital Course


Free Text/Narrative:: 





Caty is a 75-year-old female with past medical history of COPD on home oxygen 

1 L at night, hypertension, CAD, A. fib who was sent from clinic for evaluation 

of shortness of breath.  He was admitted for COPD exacerbation and acute on 

chronic respiratory failure.  Chest x-ray was negative for infiltrate.  She was 

managed with breathing treatment with significant improvement.  She was 

maintained on 2 L oxygen via nasal cannula with difficulty weaning off.  Her 

resting RA sat was 91%.  Lowest oxygen level while walking 84%.  Post recovery 

1 minute sats was 94%.  Patient will require 2 L with activity.  She was 

discharged in stable condition with plan to follow-up with PCP.


Diagnosis: Stroke: No





- Discharge Data


Discharge Date: 02/17/20


Discharge Disposition: Home, Self-Care 01


Condition: Good





- Referral to Home Health


Primary Care Physician: 


PCP Unobtainable








- Discharge Diagnosis/Problem(s)


(1) COPD exacerbation


SNOMED Code(s): 813592428


   ICD Code: J44.1 - CHRONIC OBSTRUCTIVE PULMONARY DISEASE W (ACUTE) 

EXACERBATION   Status: Acute   





(2) HTN (hypertension)


SNOMED Code(s): 44556986


   ICD Code: I10 - ESSENTIAL (PRIMARY) HYPERTENSION   Status: Acute   





- Patient Summary/Data


Consults: 


 Consultations





02/14/20 17:30


Respiratory Care Assess and Treatment [CONS] Routine 














- Patient Instructions


Diet: Heart Healthy Diet


Activity: As Tolerated


Driving: May Drive Today


Showering/Bathing: May Shower


Notify Provider of: Fever, Increased Pain, Nausea and/or Vomiting





- Discharge Plan


*PRESCRIPTION DRUG MONITORING PROGRAM REVIEWED*: Not Applicable


*COPY OF PRESCRIPTION DRUG MONITORING REPORT IN PATIENT RUPALI: Not Applicable


Prescriptions/Med Rec: 


Amoxicillin/Clavulanate K [Augmentin 875-125 MG] 1 tab PO BID #10 tablet


predniSONE 20 mg PO WITHBREAKFAST #5 tab


Home Medications: 


 Home Meds





Acetaminophen [Tylenol Extra Strength] 1,000 mg PO Q6HR PRN 10/28/17 [History]


Amiodarone [Cordarone] 200 mg PO DAILY 10/28/17 [History]


Budesonide/Formoterol Fumarate [Symbicort 80-4.5 MCG] 2 puff INH BID 10/28/17 [

History]


Cholecalciferol (Vitamin D3) [Vitamin D3] 2,000 unit PO DAILY 10/28/17 [History]


Fish Oil/Omega-3 Fatty Acids [Fish Oil 1,000 MG] 1,000 mg PO DAILY 10/28/17 [

History]


Fluticasone Propionate [Flonase] 50 mcg NEWTON DAILY PRN 10/28/17 [History]


Losartan [Cozaar] 25 mg PO DAILY 10/28/17 [History]


Multivitamin [Daily Multiple Vitamin] 1 tab PO DAILY 10/28/17 [History]


Rivaroxaban [Xarelto] 15 mg PO DAILY 10/28/17 [History]


Spironolactone [Aldactone] 25 mg PO DAILY 10/28/17 [History]


atenoloL [Atenolol] 75 mg PO DAILY 10/28/17 [History]


atorvaSTATin [Lipitor] 20 mg PO DAILY 10/28/17 [History]


Albuterol [Proair HFA] 2 puff INH Q6HR PRN 04/19/18 [History]


Codeine/Promethazine [Phenergan with Codeine] 5 ml PO Q4HR PRN 04/19/18 [History

]


Loratadine 10 mg PO DAILY 04/19/18 [History]


Allopurinol [Zyloprim] 100 mg PO DAILY 02/14/20 [History]


Ammonium Lactate [Amlactin 12% Lotion] 1 dose TP BID PRN 02/14/20 [History]


Furosemide [Lasix] 80 mg PO BID 02/14/20 [History]


amLODIPine Besylate [Norvasc] 10 mg PO DAILY 02/14/20 [History]


Amoxicillin/Clavulanate K [Augmentin 875-125 MG] 1 tab PO BID #10 tablet 02/17/ 20 [Rx]


predniSONE 20 mg PO WITHBREAKFAST #5 tab 02/17/20 [Rx]








Oxygen Therapy Mode: Nasal Cannula


Patient Handouts:  Amoxicillin; Clavulanic Acid tablets, Prednisone delayed-

release tablets, Chronic Obstructive Pulmonary Disease, Easy-to-Read, 

Hyperglycemia, Easy-to-Read





- Discharge Summary/Plan Comment


DC Time >30 min.: Yes





- General Info


Date of Service: 02/17/20


Admission Dx/Problem (Free Text: 


 Admission Diagnosis/Problem





Admission Diagnosis/Problem      COPD, Severe chronic obstructive pulmonary


                                 disease








Subjective Update: 


Caty is a 75-year-old female with past medical history of COPD on home oxygen 

1 L at night, hypertension, CAD, A. fib who was sent from clinic for evaluation 

of shortness of breath. She was admitted for COPD exacerbation with acute on 

chronic respiratory failure. 





Today she is doing ok but still on 2 L via NC and saturating well. She denies 

fever, chills. No SOB, chest pain. No wheezing. 


Functional Status: Reports: Pain Controlled





- Review of Systems


General: Reports: No Symptoms


HEENT: Reports: No Symptoms


Pulmonary: Reports: No Symptoms


Cardiovascular: Reports: No Symptoms


Gastrointestinal: Reports: No Symptoms


Genitourinary: Reports: No Symptoms


Musculoskeletal: Reports: No Symptoms


Skin: Reports: No Symptoms


Neurological: Reports: No Symptoms


Psychiatric: Reports: No Symptoms





- Patient Data


Vitals - Most Recent: 


 Last Vital Signs











Temp  97.6 F   02/17/20 08:00


 


Pulse  71   02/17/20 09:14


 


Resp  20   02/17/20 08:00


 


BP  115/41 L  02/17/20 09:14


 


Pulse Ox  93 L  02/17/20 08:00











Weight - Most Recent: 196 lb 12.8 oz


I&O - Last 24 hours: 


 Intake & Output











 02/16/20 02/17/20 02/17/20





 22:59 06:59 14:59


 


Intake Total 400  


 


Output Total 300  


 


Balance 100  











Lab Results - Last 24 hrs: 


 Laboratory Results - last 24 hr











  02/16/20 02/16/20 02/16/20 Range/Units





  10:16 10:16 11:55 


 


WBC  14.7 H    (5.0-10.0)  10^3/uL


 


RBC  3.63 L    (4.2-5.4)  10^6/uL


 


Hgb  11.7 L    (12.0-16.0)  g/dL


 


Hct  35.1 L    (37.0-47.0)  %


 


MCV  96.7    ()  fL


 


MCH  32.2    (27.0-34.0)  pg


 


MCHC  33.3    (33.0-35.0)  g/dL


 


Plt Count  273    (150-450)  10^3/uL


 


Sodium   131 L   (135-145)  mmol/L


 


Potassium   3.6   (3.6-5.0)  mmol/L


 


Chloride   95 L   (101-111)  mmol/L


 


Carbon Dioxide   21.0   (21.0-31.0)  mmol/L


 


Anion Gap   18.6   


 


BUN   44 H   (7-18)  mg/dL


 


Creatinine   2.4 H   (0.6-1.3)  mg/dL


 


Est Cr Clr Drug Dosing   19.33   mL/min


 


Estimated GFR (MDRD)   20   


 


Glucose   408 H*   ()  mg/dL


 


POC Glucose    343 H  ()  mg/dl


 


Calcium   8.6   (8.4-10.2)  mg/dl


 


Phosphorus   4.1   (2.5-4.6)  mg/dL


 


Magnesium   2.0   (1.8-2.5)  mg/dL


 


B-Natriuretic Peptide   396 H   (0-100)  pg/ml














  02/16/20 02/16/20 02/17/20 Range/Units





  17:06 20:33 07:44 


 


WBC     (5.0-10.0)  10^3/uL


 


RBC     (4.2-5.4)  10^6/uL


 


Hgb     (12.0-16.0)  g/dL


 


Hct     (37.0-47.0)  %


 


MCV     ()  fL


 


MCH     (27.0-34.0)  pg


 


MCHC     (33.0-35.0)  g/dL


 


Plt Count     (150-450)  10^3/uL


 


Sodium     (135-145)  mmol/L


 


Potassium     (3.6-5.0)  mmol/L


 


Chloride     (101-111)  mmol/L


 


Carbon Dioxide     (21.0-31.0)  mmol/L


 


Anion Gap     


 


BUN     (7-18)  mg/dL


 


Creatinine     (0.6-1.3)  mg/dL


 


Est Cr Clr Drug Dosing     mL/min


 


Estimated GFR (MDRD)     


 


Glucose     ()  mg/dL


 


POC Glucose  265 H  289 H  317 H  ()  mg/dl


 


Calcium     (8.4-10.2)  mg/dl


 


Phosphorus     (2.5-4.6)  mg/dL


 


Magnesium     (1.8-2.5)  mg/dL


 


B-Natriuretic Peptide     (0-100)  pg/ml














  02/17/20 Range/Units





  07:50 


 


WBC  12.8 H  (5.0-10.0)  10^3/uL


 


RBC  3.30 L  (4.2-5.4)  10^6/uL


 


Hgb  10.6 L  (12.0-16.0)  g/dL


 


Hct  32.0 L  (37.0-47.0)  %


 


MCV  97.0  ()  fL


 


MCH  32.1  (27.0-34.0)  pg


 


MCHC  33.1  (33.0-35.0)  g/dL


 


Plt Count  244  (150-450)  10^3/uL


 


Sodium   (135-145)  mmol/L


 


Potassium   (3.6-5.0)  mmol/L


 


Chloride   (101-111)  mmol/L


 


Carbon Dioxide   (21.0-31.0)  mmol/L


 


Anion Gap   


 


BUN   (7-18)  mg/dL


 


Creatinine   (0.6-1.3)  mg/dL


 


Est Cr Clr Drug Dosing   mL/min


 


Estimated GFR (MDRD)   


 


Glucose   ()  mg/dL


 


POC Glucose   ()  mg/dl


 


Calcium   (8.4-10.2)  mg/dl


 


Phosphorus   (2.5-4.6)  mg/dL


 


Magnesium   (1.8-2.5)  mg/dL


 


B-Natriuretic Peptide   (0-100)  pg/ml











Med Orders - Current: 


 Current Medications





Acetaminophen (Tylenol)  650 mg PO Q4H PRN


   PRN Reason: Pain (Mild 1-3)/fever


Albuterol (Proventil Hfa)  0 gm INH Q6H PRN


   PRN Reason: Wheezing


Albuterol/Ipratropium (Duoneb 3.0-0.5 Mg/3 Ml)  3 ml NEB Q4H [facility]


   Last Admin: 02/17/20 07:23 Dose:  3 ml


Allopurinol (Zyloprim)  100 mg PO DAILY [facility]


   Last Admin: 02/17/20 09:05 Dose:  100 mg


Amiodarone HCl (Cordarone)  200 mg PO DAILY [facility]


   Last Admin: 02/17/20 09:10 Dose:  200 mg


Amlodipine Besylate (Norvasc)  10 mg PO DAILY [facility]


   Last Admin: 02/17/20 09:14 Dose:  Not Given


Atenolol (Tenormin)  75 mg PO DAILY [facility]


   Last Admin: 02/17/20 09:14 Dose:  Not Given


Atorvastatin Calcium (Lipitor)  20 mg PO DAILY [facility]


   Last Admin: 02/17/20 09:14 Dose:  Not Given


Docusate Sodium (Colace)  100 mg PO BID PRN


   PRN Reason: Constipation


   Last Admin: 02/16/20 21:25 Dose:  100 mg


Fluticasone Propionate (Flonase)  0 gm NEWTON DAILY PRN


   PRN Reason: Rhinitis


Furosemide (Lasix)  80 mg PO BIDDIURETIC [facility]


   Last Admin: 02/17/20 09:03 Dose:  80 mg


Heparin Sodium (Porcine) (Heparin Sodium)  5,000 units SUBCUT Q12HR [facility]


   Last Admin: 02/17/20 09:14 Dose:  Not Given


Ceftriaxone Sodium 1 gm/ (Sodium Chloride)  50 mls @ 100 mls/hr IV Q24H [facility]


   Last Admin: 02/16/20 20:38 Dose:  50 mls/hr


Sodium Chloride (Normal Saline)  1,000 mls @ 100 mls/hr IV ASDIRECTED [facility]


   Last Admin: 02/16/20 07:18 Dose:  100 mls/hr


Insulin Human Lispro (Humalog)  0 unit SUBCUT WITHMEALSANDBED [facility]; Protocol


   Last Admin: 02/17/20 09:30 Dose:  12 units


Insulin Human Lispro (Humalog)  5 unit SUBCUT TIDMEALS [facility]


   Last Admin: 02/17/20 09:29 Dose:  5 units


Loratadine (Claritin)  10 mg PO DAILY [facility]


   Last Admin: 02/17/20 09:13 Dose:  Not Given


Losartan Potassium (Cozaar)  25 mg PO DAILY [facility]


   Last Admin: 02/17/20 09:13 Dose:  Not Given


Magnesium Hydroxide (Milk Of Magnesia)  30 ml PO Q12H PRN


   PRN Reason: Constipation


Methylprednisolone Sodium Succinate (Solu-Medrol)  40 mg IVPUSH Q12H [facility]


   Last Admin: 02/17/20 09:12 Dose:  40 mg


Mometasone Furoate/Formoterol Fumar (Dulera 100-5 Mcg)  2 puff IH BID [facility]


   Last Admin: 02/17/20 09:19 Dose:  2 puff


Ondansetron HCl (Zofran)  4 mg IVPUSH Q6H PRN


   PRN Reason: Nausea/Vomiting


Promethazine HCl/Codeine (Phenergan With Codeine)  5 ml PO Q4HR PRN


   PRN Reason: Cough


Rivaroxaban (Xarelto)  15 mg PO DAILY [facility]


   Last Admin: 02/17/20 09:15 Dose:  Not Given


Spironolactone (Aldactone)  25 mg PO DAILY [facility]


   Last Admin: 02/17/20 09:05 Dose:  25 mg





Discontinued Medications





Albuterol/Ipratropium (Duoneb 3.0-0.5 Mg/3 Ml)  3 ml NEB Q4H [facility]


   Last Admin: 02/15/20 09:45 Dose:  3 ml


Azithromycin 500 mg/ Sodium (Chloride)  250 mls @ 250 mls/hr IV Q24H [facility]


   Last Admin: 02/14/20 18:36 Dose:  Not Given


Ceftriaxone Sodium 1 gm/ (Sodium Chloride)  50 mls @ 100 mls/hr IV Q24H [facility]


   Last Admin: 02/15/20 06:45 Dose:  Not Given


Magnesium Sulfate 2 gm/ Premix  50 mls @ 25 mls/hr IV ONETIME ONE


   Stop: 02/15/20 11:11


   Last Admin: 02/15/20 10:17 Dose:  25 mls/hr


Insulin Human Regular (Humulin R)  10 unit IV ONETIME ONE


   Stop: 02/16/20 11:22


   Last Admin: 02/16/20 11:33 Dose:  10 units


Methylprednisolone Sodium Succinate (Solu-Medrol)  40 mg IVPUSH Q8H [facility]


   Last Admin: 02/16/20 09:22 Dose:  40 mg











- Exam


Quality Assessment: Reports: Supplemental Oxygen, DVT Prophylaxis


General: Reports: Alert, Oriented


HEENT: Reports: Pupils Equal, Pupils Reactive, EOMI, Mucous Membr. Moist/Pink


Neck: Reports: Supple


Lungs: Reports: Clear to Auscultation, Normal Respiratory Effort


Cardiovascular: Reports: Regular Rate, Regular Rhythm


GI/Abdominal Exam: Normal Bowel Sounds, Soft, Non-Tender, No Organomegaly, No 

Distention, No Abnormal Bruit, No Mass, Pelvis Stable


 (Female) Exam: Normal External Exam, Normal Speculum Exam, Normal Bimanual 

Exam


Rectal (Female) Exam: Normal Exam, Normal Rectal Tone


Back Exam: Reports: Normal Inspection, Full Range of Motion


Extremities: Normal Inspection, Normal Range of Motion, Non-Tender, No Pedal 

Edema, Normal Capillary Refill


Skin: Reports: Warm, Dry, Intact


Wound/Incisions: Reports: Healing Well


Neurological: Reports: No New Focal Deficit


Psy/Mental Status: Reports: Alert, Normal Affect, Normal Mood
English